# Patient Record
Sex: MALE | Race: WHITE | Employment: FULL TIME | URBAN - METROPOLITAN AREA
[De-identification: names, ages, dates, MRNs, and addresses within clinical notes are randomized per-mention and may not be internally consistent; named-entity substitution may affect disease eponyms.]

---

## 2017-05-28 ENCOUNTER — APPOINTMENT (OUTPATIENT)
Dept: GENERAL RADIOLOGY | Facility: CLINIC | Age: 47
End: 2017-05-28
Attending: EMERGENCY MEDICINE
Payer: COMMERCIAL

## 2017-05-28 ENCOUNTER — HOSPITAL ENCOUNTER (EMERGENCY)
Facility: CLINIC | Age: 47
Discharge: HOME | End: 2017-05-28
Attending: EMERGENCY MEDICINE | Admitting: EMERGENCY MEDICINE
Payer: COMMERCIAL

## 2017-05-28 VITALS
SYSTOLIC BLOOD PRESSURE: 147 MMHG | BODY MASS INDEX: 25.2 KG/M2 | WEIGHT: 180 LBS | OXYGEN SATURATION: 100 % | RESPIRATION RATE: 18 BRPM | DIASTOLIC BLOOD PRESSURE: 87 MMHG | HEART RATE: 59 BPM | TEMPERATURE: 97.7 F | HEIGHT: 71 IN

## 2017-05-28 DIAGNOSIS — S62.623A CLOSED DISPLACED FRACTURE OF MIDDLE PHALANX OF LEFT MIDDLE FINGER, INITIAL ENCOUNTER: ICD-10-CM

## 2017-05-28 DIAGNOSIS — S09.90XA CLOSED HEAD INJURY, INITIAL ENCOUNTER: ICD-10-CM

## 2017-05-28 DIAGNOSIS — T07.XXXA MULTIPLE ABRASIONS: ICD-10-CM
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
## 2017-05-28 PROCEDURE — 25000132 ZZH RX MED GY IP 250 OP 250 PS 637: Performed by: EMERGENCY MEDICINE

## 2017-05-28 PROCEDURE — 99283 EMERGENCY DEPT VISIT LOW MDM: CPT

## 2017-05-28 PROCEDURE — 73140 X-RAY EXAM OF FINGER(S): CPT | Mod: LT

## 2017-05-28 RX ORDER — ACETAMINOPHEN 325 MG/1
650 TABLET ORAL ONCE
Status: COMPLETED | OUTPATIENT
Start: 2017-05-28 | End: 2017-05-28

## 2017-05-28 RX ADMIN — ACETAMINOPHEN 650 MG: 325 TABLET, FILM COATED ORAL at 14:54

## 2017-05-28 NOTE — ED AVS SNAPSHOT
Emergency Department    [address] [facility] [address]    Phone:  [phone]    Fax:  [phone]                                       Colten Lucas   MRN: [id number]    Department:   Emergency Department   Date of Visit:  5/28/2017           Patient Information     Date Of Birth          1970        Your diagnoses for this visit were:     Fall, initial encounter     Closed head injury, initial encounter     Closed displaced fracture of middle phalanx of left middle finger, initial encounter     Multiple abrasions        You were seen by Ward Carroll MD.      Follow-up Information     Schedule an appointment as soon as possible for a visit with Frieda Holland MD.    Specialty:  Orthopedics    Contact information:    [facility]  [facility] [address] [address]  [phone]        Discharge References/Attachments     FRACTURE, FINGER, CLOSED (ENGLISH)    HEAD INJURY, NO WAKE-UP (ADULT) (ENGLISH)      24 Hour Appointment Hotline       To make an appointment at any [facility], call [phone] ([phone]). If you don't have a family doctor or clinic, we will help you find one. [facility] clinics are conveniently located to serve the needs of you and your family.             Review of your medicines      Notice     You have not been prescribed any medications.            Procedures and tests performed during your visit     Fingers XR, 2-3 views, left      Orders Needing Specimen Collection     None      Pending Results     No orders found from 5/26/2017 to 5/29/2017.            Pending Culture Results     No orders found from 5/26/2017 to 5/29/2017.            Pending Results Instructions     If you had any lab results that were not finalized at the time of your Discharge, you can call the ED Lab Result RN at [phone]. You will be contacted by this team for any positive Lab results or changes in treatment. The nurses are  available 7 days a week from 10A to 6:30P.  You can leave a message 24 hours per day and they will return your call.        Test Results From Your Hospital Stay        5/28/2017  2:58 PM      Narrative     XR FINGER LT G/E 2 VW 5/28/2017 2:54 PM    COMPARISON: None.    HISTORY: Fall        Impression     IMPRESSION: Mildly displaced avulsion fracture at the lateral volar  base of the left third middle phalanx. No other fractures are  suspected in the left hand. Joints are preserved.    BENNY FOSTER                Clinical Quality Measure: Blood Pressure Screening     Your blood pressure was checked while you were in the emergency department today. The last reading we obtained was  BP: 147/87 . Please read the guidelines below about what these numbers mean and what you should do about them.  If your systolic blood pressure (the top number) is less than 120 and your diastolic blood pressure (the bottom number) is less than 80, then your blood pressure is normal. There is nothing more that you need to do about it.  If your systolic blood pressure (the top number) is 120-139 or your diastolic blood pressure (the bottom number) is 80-89, your blood pressure may be higher than it should be. You should have your blood pressure rechecked within a year by a primary care provider.  If your systolic blood pressure (the top number) is 140 or greater or your diastolic blood pressure (the bottom number) is 90 or greater, you may have high blood pressure. High blood pressure is treatable, but if left untreated over time it can put you at risk for heart attack, stroke, or kidney failure. You should have your blood pressure rechecked by a primary care provider within the next 4 weeks.  If your provider in the emergency department today gave you specific instructions to follow-up with your doctor or provider even sooner than that, you should follow that instruction and not wait for up to 4 weeks for your follow-up visit.        Thank  "you for choosing [facility]       Thank you for choosing [facility] for your care. Our goal is always to provide you with excellent care. Hearing back from our patients is one way we can continue to improve our services. Please take a few minutes to complete the written survey that you may receive in the mail after you visit with us. Thank you!        [employer] Information     [employer] lets you send messages to your doctor, view your test results, renew your prescriptions, schedule appointments and more. To sign up, go to [email or web address] . Click on \"Log in\" on the left side of the screen, which will take you to the Welcome page. Then click on \"Sign up Now\" on the right side of the page.     You will be asked to enter the access code listed below, as well as some personal information. Please follow the directions to create your username and password.     Your access code is: [email or web address]  Expires: 2017  3:22 PM     Your access code will  in 90 days. If you need help or a new code, please call your [facility] clinic or [phone].        Care EveryWhere ID     This is your Care EveryWhere ID. This could be used by other organizations to access your [facility] medical records  [id number]        After Visit Summary       This is your record. Keep this with you and show to your community pharmacist(s) and doctor(s) at your next visit.                  "

## 2017-05-28 NOTE — ED NOTES
Agree with triage note. Fall happened at 0745 this AM, since then, pt has developed swelling to left third and fourth fingers. Modified NIH assessment unremarkable.

## 2017-05-29 NOTE — ED PROVIDER NOTES
CHIEF COMPLAINT:  Fall.      HISTORY OF PRESENT ILLNESS:  Colten Lucas is a 47-year-old, out running with his dog, when his dog on the leash cut in front of him, causing him to fall forward.  He remembers going down and using his hands and knees to catch him, but he did go sideways and did strike the right side of his face.  The patient states he briefly saw stars but did not pass out.  He was able to get up and complete his run and get home.  When he talked to his daughter at home, though, he stated he could not recall how he got home or even using the code to open the garage door.  He has a mild headache.  He initially had some nausea and sweats but has not vomited, and he does not feel like vomiting.  He has no neck pain.  He has no visual problems and no focal numbness or weakness.  He does have some concerns about the middle and ring fingers on his left hand which are sore and bruised.  He did clean all of his abrasions.  His tetanus is up to date.      PAST MEDICAL HISTORY:  Unremarkable for significant surgeries or hospitalizations other than tonsillectomy.      MEDICATIONS:  None.      ALLERGIES:  None.      FAMILY AND SOCIAL HISTORY:  He does orthopedic coding.  .  Does not smoke or drink significantly.      REVIEW OF SYSTEMS:  As noted, all other systems negative.      PHYSICAL EXAMINATION:   GENERAL:  White male sitting in a chair.   VITAL SIGNS:  Temperature 97.7, blood pressure 147/87, pulse 59, respirations 18, pulse ox 100% on room air.   HEENT:  The pupils are equal and reactive at 3 mm.  The extraocular movements are intact.  TMs are not seen due to cerumen.  Oropharynx is clear.  The face reveals no swelling.  When he does open and close his jaw, he has some minimal left TMJ tenderness, but good alignment and no deformity.   NECK:  There is no posterior tenderness.  Full range of motion.   CHEST:  Nontender.   LUNGS:  Clear.   HEART:  Tones regular, without murmurs, rubs or gallops.    ABDOMEN:  Soft without tenderness or masses.   MUSCULOSKELETAL:  Abrasions on both knees, but no bony tenderness.  Full range of motion.  No effusion.  Left hand reveals some ecchymoses of the ring and long fingers, primarily at the PIP joints.  The patient has full extension and flexion, both profundus and superficialis, against resistance.  No joint effusion is palpated.   NEUROLOGIC:  He is awake, alert and appropriate.  Fluent speech.  No facial asymmetry.  Normal sensation, motor and coordination throughout.  His gait is stable.  He is GCS 15.      COURSE:  The patient had cleaned his wounds himself.  He received two Tylenol.      He had an x-ray of the left hand.  The left hand shows a small avulsion fracture at the base of the middle phalanx of the long finger.      The patient had a ring on his left ring finger, and we assisted in removing this, and then the ring and long finger were daphnie taped.      It has now been 8 hours since injury.  He has no sign of a significant intracranial head injury, and does not require a CT scan of the head.  Also, I do not think there is any facial fracture.  There may have been a contusion at the TMJ on the left side.      The patient should use cold and Tylenol.  I have asked him to make a follow-up with Dr. Marleen Holland for his finger injury, and asked him to take it easy this week without significant physical exertion until his head has completely cleared and he is symptom-free. Follow up with his PMD next week. If he should have recurrent or worsening problems such as persistent or worsening headache, persistent vomiting, or confusion, he is to return to the ED.      IMPRESSION:   1.  Fall.   2.  Closed head trauma.   3.  TMJ contusion.   4.  Multiple abrasions.   5.  Avulsion fracture of the base of the middle phalanx of left long finger.      PLAN:  As noted above.         QUINTON SHERIDAN MD             D: 05/28/2017 16:34   T: 05/28/2017 23:21   MT: [id number]       Name:     HAILEE JUÁREZ   MRN:      [id number]        Account:      [id number]   :      1970           Visit Date:   2017      Document: [id number]       cc: Frieda Holland MD

## 2017-05-31 ENCOUNTER — OFFICE VISIT (OUTPATIENT)
Dept: FAMILY MEDICINE | Facility: CLINIC | Age: 47
End: 2017-05-31
Payer: COMMERCIAL

## 2017-05-31 VITALS
HEART RATE: 81 BPM | DIASTOLIC BLOOD PRESSURE: 84 MMHG | OXYGEN SATURATION: 100 % | BODY MASS INDEX: 26.32 KG/M2 | HEIGHT: 71 IN | TEMPERATURE: 98.4 F | SYSTOLIC BLOOD PRESSURE: 132 MMHG | WEIGHT: 188 LBS
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
## 2017-05-31 DIAGNOSIS — S06.0X0D CONCUSSION, WITHOUT LOSS OF CONSCIOUSNESS, SUBSEQUENT ENCOUNTER: ICD-10-CM

## 2017-05-31 DIAGNOSIS — S09.90XD CLOSED HEAD INJURY, SUBSEQUENT ENCOUNTER: ICD-10-CM

## 2017-05-31 DIAGNOSIS — S62.623D CLOSED DISPLACED FRACTURE OF MIDDLE PHALANX OF LEFT MIDDLE FINGER WITH ROUTINE HEALING, SUBSEQUENT ENCOUNTER: Primary | ICD-10-CM

## 2017-05-31 PROCEDURE — 99203 OFFICE O/P NEW LOW 30 MIN: CPT | Performed by: PHYSICIAN ASSISTANT

## 2017-05-31 NOTE — LETTER
[facility]  [address]  [facility] [address]  Phone: [phone]  Fax: [phone]    May 31, 2017        Colten Lucas  [address]  [facility] [address]          To whom it may concern:    RE: [place] MCCRAY Lance    Patient was seen and treated today at our clinic. Please allow to work with restrictions including short interval concentration or allow to leave work early if becomes symptomatic for the next 1-2 weeks.     Please contact me for questions or concerns.      Sincerely,        Dalia Murcia PA-C

## 2017-05-31 NOTE — MR AVS SNAPSHOT
After Visit Summary   5/31/2017    Colten Lucas    MRN: [id number]           Patient Information     Date Of Birth          1970        Visit Information        Provider Department      5/31/2017 1:00 PM Dalia Murcia PA-C [facility]        Today's Diagnoses     Closed displaced fracture of middle phalanx of left middle finger with routine healing, subsequent encounter    -  1    Fall, subsequent encounter        Closed head injury, subsequent encounter        Concussion, without loss of consciousness, subsequent encounter          Care Instructions    Given displacement of fracture still did advise consult with hand specialist to ensure no further treatment required.  May also have sustained low-grade concussion given mild HAs and general fatigue.  Reassuring neuro exam.  Advised continued cognitive rest and low-impact activity.  Re-check in 2 weeks, sooner with concerns.    Electronically Signed By: Dalia Murcia PA-C            Follow-ups after your visit        Who to contact     If you have questions or need follow up information about today's clinic visit or your schedule please contact [facility] directly at [phone].  Normal or non-critical lab and imaging results will be communicated to you by MyChart, letter or phone within 4 business days after the clinic has received the results. If you do not hear from us within 7 days, please contact the clinic through MyChart or phone. If you have a critical or abnormal lab result, we will notify you by phone as soon as possible.  Submit refill requests through [employer] or call your pharmacy and they will forward the refill request to us. Please allow 3 business days for your refill to be completed.          Additional Information About Your Visit        MyChart Information     [employer] lets you send messages to your doctor, view your test results, renew your prescriptions, schedule  "appointments and more. To sign up, go to [email or web address] . Click on \"Log in\" on the left side of the screen, which will take you to the Welcome page. Then click on \"Sign up Now\" on the right side of the page.     You will be asked to enter the access code listed below, as well as some personal information. Please follow the directions to create your username and password.     Your access code is: [email or web address]  Expires: 2017  3:22 PM     Your access code will  in 90 days. If you need help or a new code, please call your [facility] clinic or [phone].        Care EveryWhere ID     This is your Care EveryWhere ID. This could be used by other organizations to access your [facility] medical records  [id number]        Your Vitals Were     Pulse Temperature Height Pulse Oximetry BMI (Body Mass Index)       81 98.4  F (36.9  C) (Oral) 5' 11\" (1.803 m) 100% 26.22 kg/m2        Blood Pressure from Last 3 Encounters:   17 132/84   17 147/87    Weight from Last 3 Encounters:   17 188 lb (85.3 kg)   17 180 lb (81.6 kg)              Today, you had the following     No orders found for display       Primary Care Provider    Physician No Ref-Primary       No address on file        Thank you!     Thank you for choosing [facility]  for your care. Our goal is always to provide you with excellent care. Hearing back from our patients is one way we can continue to improve our services. Please take a few minutes to complete the written survey that you may receive in the mail after your visit with us. Thank you!             Your Updated Medication List - Protect others around you: Learn how to safely use, store and throw away your medicines at www.disposemymeds.org.      Notice  As of 2017  1:20 PM    You have not been prescribed any medications.      "

## 2017-05-31 NOTE — PROGRESS NOTES
SUBJECTIVE:                                                    Colten Lucas is a 47 year old male who presents to clinic today for the following health issues:      ED/UC Followup:    Facility:  [facility]  Date of visit: 5/28/2017  Reason for visit: Fall and hit his head and injured his finger.  ED reviewed had been running with his dog when dog pulled him with leash resulting fall.   Sustained knee abrasions, but no other knee issues.  Was able to run home still.  Did wind up striking face, cheek, shoulder and elbow but reports no problems with any of this.  No LOC, vision changes, vomiting.   After fall felt a little sweaty and nauseated, but that was it.  Felt better yesterday afternoon.  Today had a little HA and just general overall feeling of fatigue.   Still will feel a little nauseated, but overall this is better from before. Estimates at least 50% improvement.  Continues to have no vomiting.   No prior hx of concussion or significant head injury.     Did sustain volar finger fracture. Was advised to see hand visit. Would like new referral for [facility] as closer for him to get to with work.     R-handed.     Sochx: codes for orthopedic surgery. Tried to work yesterday for about 2 hours, but sitting and looking at all screens/codes couldn't focus well and gave him a headache. Works for [employer].    Current Status: feeling pretty good he said     Problem list and histories reviewed & adjusted, as indicated.  Additional history: as documented    There is no problem list on file for this patient.    Past Surgical History:   Procedure Laterality Date     C NONSPECIFIC PROCEDURE  ~1973    tonsillectomy       Social History   Substance Use Topics     Smoking status: Never Smoker     Smokeless tobacco: Not on file     Alcohol use Yes     History reviewed. No pertinent family history.      No current outpatient prescriptions on file.     Allergies   Allergen Reactions     Penicillins Rash  "      Reviewed and updated as needed this visit by clinical staff       Reviewed and updated as needed this visit by Provider           ROS:  Constitutional, HEENT, cardiovascular, pulmonary, GI, musculoskeletal, neuro systems are negative, except as otherwise noted.    OBJECTIVE:                                                    /84  Pulse 81  Temp 98.4  F (36.9  C) (Oral)  Ht 5' 11\" (1.803 m)  Wt 188 lb (85.3 kg)  SpO2 100%  BMI 26.22 kg/m2  Body mass index is 26.22 kg/(m^2).  GENERAL: healthy, alert and no distress  EYES: Eyes grossly normal to inspection, PERRL and conjunctivae and sclerae normal. EOMS intact.  HENT: ear canals and TM's normal excluding mostly obstructed by cerumen impaction bilaterally. Visualized portions were normal, nose and mouth without ulcers or lesions  NECK: no adenopathy, no asymmetry, masses.  RESP: lungs clear to auscultation - no rales, rhonchi or wheezes  CV: regular rate and rhythm, normal S1 S2, no S3 or S4, no murmur, click or rub, no peripheral edema and peripheral pulses strong  MS: pt has L 3rd/4th fingers buddy-taped. This was removed for exam. Pt is TTP along volar lateral aspect c/w known avulsion fracture. Has limited ROM at PIPJ due to pain. Slight healed abrasion noted to lateral aspect of distal phalanx, but remainder of finger non-tender.  Neuro: A&Ox3. No facial asymmetry. Cranial nerves grossly intact. Rapid alternating hand movements, finger-to-nose, and heel-to-shin exercises intact. Negative Romberg.     Diagnostic Test Results:  none      ASSESSMENT/PLAN:                                                        ICD-10-CM    1. Closed displaced fracture of middle phalanx of left middle finger with routine healing, subsequent encounter S62.623D ORTHOPEDICS ADULT REFERRAL   2. Fall, subsequent encounter W19.XXXD    3. Closed head injury, subsequent encounter S09.90XD    4. Concussion, without loss of consciousness, subsequent encounter S06.0X0D    New " referral placed for [facility] hand specialist to ensure no further treatment required given volar avulsion fx.  Pt will continue with daphnie taping in the meantime.  Also reviewed that he may have sustained a mild concussion with mild HA's/general fatigue.  Given difficulties with concentrating at work and should have cognitive rest I did provide a note for work for the next 1-2 weeks as generally concussions resolve on own within this timeframe.  Otherwise, reasssuring hx and neuro exam.  See Patient Instructions  Patient Instructions   Given displacement of fracture still did advise consult with hand specialist to ensure no further treatment required.  May also have sustained low-grade concussion given mild HAs and general fatigue.  Reassuring neuro exam.  Advised continued cognitive rest and low-impact activity.  Re-check in 2 weeks, sooner with concerns.    Electronically Signed By: Dalia Murcia PA-C

## 2017-05-31 NOTE — NURSING NOTE
"Chief Complaint   Patient presents with     ER F/U       Initial /84  Pulse 81  Temp 98.4  F (36.9  C) (Oral)  Ht 5' 11\" (1.803 m)  Wt 188 lb (85.3 kg)  SpO2 100%  BMI 26.22 kg/m2 Estimated body mass index is 26.22 kg/(m^2) as calculated from the following:    Height as of this encounter: 5' 11\" (1.803 m).    Weight as of this encounter: 188 lb (85.3 kg).  Medication Reconciliation: complete    "

## 2017-05-31 NOTE — PATIENT INSTRUCTIONS
Given displacement of fracture still did advise consult with hand specialist to ensure no further treatment required.  May also have sustained low-grade concussion given mild HAs and general fatigue.  Reassuring neuro exam.  Advised continued cognitive rest and low-impact activity.  Re-check in 2 weeks, sooner with concerns.    Electronically Signed By: Dalia Murcia PA-C

## 2017-06-02 ENCOUNTER — TELEPHONE (OUTPATIENT)
Dept: FAMILY MEDICINE | Facility: CLINIC | Age: 47
End: 2017-06-02

## 2017-06-02 NOTE — TELEPHONE ENCOUNTER
Per ED notes patient was to follow up with week with PMD for appointment. Seen by Dalia Murcia PA-C on 5/31/17. Patient is having more noticeable ringing in ears today. He has had the ringing since hitting his head on 5/28, but says its more noticeable today than it has been previously.  Has a little bit of a headache 3-4 rating. I have advised him to continue to rest when possible and decrease screen time. He says he may leave work early to rest. I told him I will review with Dalia and give him a call back.     Will route to Dalia Murcia PA-C for further review and advisement. Karen Gross R.N.

## 2017-06-02 NOTE — TELEPHONE ENCOUNTER
I left message for Moises with all of the below, informed of tomorrow clinic at [facility] and left him with that number also. Encouraged to call us back if he had any further questions or concerns. Karen Gross R.N.

## 2017-06-02 NOTE — TELEPHONE ENCOUNTER
Reason for call:  Patient reporting a symptom    Symptom or request: ringing in ears after concussion    Duration (how long have symptoms been present): since Sunday 5/28/2017    Have you been treated for this before? Yes    Additional comments: Pt was seen for follow up after ER visit.  Still having this issue with ringing in his ears.  Asking if there is anything he can do to help this.    Phone Number patient can be reached at:  Cell number on file:    Telephone Information:   Mobile [phone]       Best Time:      Can we leave a detailed message on this number:  YES    Call taken on 6/2/2017 at 9:33 AM by Monse Brady

## 2017-06-02 NOTE — TELEPHONE ENCOUNTER
Pt calling as he was seen for an ER follow up for a concussion and finger fracture.  His employer is asking he get some [place] paperwork for missed days from work on Tuesday, Wednesday and Thursday of this week and is back to work today.  [employer] will be faxing us paperwork to complete.  Pt can be reached at [phone] with any questions or concerns.  States the form will be more for the next 1 to 2 weeks for work restrictions.  He states he has a lot of ringing in his ears and a mild headache but otherwise feels as well as can be expected.  He was transferred to triage to discuss the ringing in his ears.  Will keep an eye out for the form.  Monse Brady

## 2017-06-02 NOTE — TELEPHONE ENCOUNTER
Please call pt and let him know that tinnitus can be a symptom of concussion as well. If marked continual worsening over the weekend can always be seen in the ED with consideration to head imaging, but given his mechanism of injury and normal neuro exam at our last appt together suspect he just needs more time for body to heal on own. Encourage continued cognitive rest as previously reviewed. Happy to re-check sooner in clinic if he has on-going concerns as well. Can also inform him that PL has clinic hours tomorrow should he wish.    Electronically Signed By: Dalia Murcia PA-C

## 2017-06-08 NOTE — TELEPHONE ENCOUNTER
Copy of form made for scanning, faxed and mailed original to patient.  Called and let him know he needs to be seen.  He will call within the next couple of days to make na appt.  Monse Brady

## 2017-06-13 ENCOUNTER — TELEPHONE (OUTPATIENT)
Dept: FAMILY MEDICINE | Facility: CLINIC | Age: 47
End: 2017-06-13

## 2017-06-13 NOTE — TELEPHONE ENCOUNTER
Reason for Call:  Form, our goal is to have forms completed with 72 hours, however, some forms may require a visit or additional information.    Type of letter, form or note:  FMLA    Who is the form from?: Patient    Where did the form come from: form was faxed in    What clinic location was the form placed at?: [facility]    Where the form was placed: Given to [facility].    What number is listed as a contact on the form?: fax [phone]       Additional comments: Pt has appt on Friday 6.16 to complete forms.  Will fax once done.    Call taken on 6/13/2017 at 10:31 AM by Monse Brady

## 2017-06-16 ENCOUNTER — OFFICE VISIT (OUTPATIENT)
Dept: FAMILY MEDICINE | Facility: CLINIC | Age: 47
End: 2017-06-16
Payer: COMMERCIAL

## 2017-06-16 VITALS
WEIGHT: 186 LBS | HEIGHT: 71 IN | BODY MASS INDEX: 26.04 KG/M2 | SYSTOLIC BLOOD PRESSURE: 138 MMHG | DIASTOLIC BLOOD PRESSURE: 78 MMHG | HEART RATE: 91 BPM | TEMPERATURE: 97.7 F | OXYGEN SATURATION: 99 %
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
## 2017-06-16 DIAGNOSIS — H93.13 TINNITUS, BILATERAL: ICD-10-CM

## 2017-06-16 DIAGNOSIS — S06.0X0A CONCUSSION WITHOUT LOSS OF CONSCIOUSNESS, INITIAL ENCOUNTER: Primary | ICD-10-CM

## 2017-06-16 PROCEDURE — 99214 OFFICE O/P EST MOD 30 MIN: CPT | Performed by: PHYSICIAN ASSISTANT

## 2017-06-16 NOTE — NURSING NOTE
"Chief Complaint   Patient presents with     Forms       Initial /78  Pulse 91  Temp 97.7  F (36.5  C) (Oral)  Ht 5' 11\" (1.803 m)  Wt 186 lb (84.4 kg)  SpO2 99%  BMI 25.94 kg/m2 Estimated body mass index is 25.94 kg/(m^2) as calculated from the following:    Height as of this encounter: 5' 11\" (1.803 m).    Weight as of this encounter: 186 lb (84.4 kg).  Medication Reconciliation: complete    "

## 2017-06-16 NOTE — PATIENT INSTRUCTIONS
Glad you're improving.  Continue low-impact activity only.  Need stepwise return and should you remain symptomatic need to return to previous phase.  Will refer for concussion rehab for on-going consult/treatment.  If any further fall would advise consideration to cardiac work-up.    Electronically Signed By: Dalia Murcia PA-C

## 2017-06-16 NOTE — MR AVS SNAPSHOT
After Visit Summary   6/16/2017    Colten Lucas    MRN: [id number]           Patient Information     Date Of Birth          1970        Visit Information        Provider Department      6/16/2017 1:00 PM Dalia Murcia PA-C [facility]        Today's Diagnoses     Concussion without loss of consciousness, initial encounter    -  1    Tinnitus, bilateral          Care Instructions    Glad you're improving.  Continue low-impact activity only.  Need stepwise return and should you remain symptomatic need to return to previous phase.  Will refer for concussion rehab for on-going consult/treatment.  If any further fall would advise consideration to cardiac work-up.    Electronically Signed By: Dalia Murcia PA-C            Follow-ups after your visit        Additional Services     CONCUSSION  REFERRAL       [facility] is referring you to the Concussion  service at [facility].      The  Representative will assist you in the coordination of your concussion care as prescribed by your physician.    The  Representative will contact you within one business day, or you may contact the  Representative at [phone].    Referral Options:  Non-Sports related concussion management and Rehab Services: Physical Therapy, Evaluate and Treat, Rehab Diagnoses: Decreased Balance and tinnitus       Coverage of these services are subject to the terms and limitations of your health insurance plan.  Please call member services at your health plan with any benefit or coverage questions.     If X-rays, CT or MRI's have been performed, please contact the facility where they were done, to arrange for  prior to your scheduled appointment.  Please bring this referral request to your appointment and present it to your specialist.                  Who to contact     If you have questions or need  "follow up information about today's clinic visit or your schedule please contact [facility] directly at [phone].  Normal or non-critical lab and imaging results will be communicated to you by MyChart, letter or phone within 4 business days after the clinic has received the results. If you do not hear from us within 7 days, please contact the clinic through [employer] or phone. If you have a critical or abnormal lab result, we will notify you by phone as soon as possible.  Submit refill requests through [employer] or call your pharmacy and they will forward the refill request to us. Please allow 3 business days for your refill to be completed.          Additional Information About Your Visit        [employer] Information     [employer] lets you send messages to your doctor, view your test results, renew your prescriptions, schedule appointments and more. To sign up, go to [email or web address] . Click on \"Log in\" on the left side of the screen, which will take you to the Welcome page. Then click on \"Sign up Now\" on the right side of the page.     You will be asked to enter the access code listed below, as well as some personal information. Please follow the directions to create your username and password.     Your access code is: [email or web address]  Expires: 2017  3:22 PM     Your access code will  in 90 days. If you need help or a new code, please call your [place] clinic or [phone].        Care EveryWhere ID     This is your Care EveryWhere ID. This could be used by other organizations to access your [place] medical records  [id number]        Your Vitals Were     Pulse Temperature Height Pulse Oximetry BMI (Body Mass Index)       91 97.7  F (36.5  C) (Oral) 5' 11\" (1.803 m) 99% 25.94 kg/m2        Blood Pressure from Last 3 Encounters:   17 138/78   17 132/84   17 147/87    Weight from Last 3 Encounters:   17 186 lb (84.4 kg)   17 188 lb (85.3 kg)   17 180 lb " (81.6 kg)              We Performed the Following     CONCUSSION  REFERRAL        Primary Care Provider    Physician No Ref-Primary       No address on file        Thank you!     Thank you for choosing [facility]  for your care. Our goal is always to provide you with excellent care. Hearing back from our patients is one way we can continue to improve our services. Please take a few minutes to complete the written survey that you may receive in the mail after your visit with us. Thank you!             Your Updated Medication List - Protect others around you: Learn how to safely use, store and throw away your medicines at www.disposemymeds.org.      Notice  As of 6/16/2017  1:38 PM    You have not been prescribed any medications.

## 2017-06-16 NOTE — TELEPHONE ENCOUNTER
Moises is calling back today and was see by Dalia Murcia PA-C today. He asked if [place] he was [place] today will cover everything for his employer or if he needs an additional note. I informed him [place] should cover but he can ask his employer if that will cover everything. I told him if they say they do need an additional note to please ask them for the specifics that they want the note to cover. Moises says he is feeling better and he will call us back if a letter would be needed in addition to the paperwork he received today. No further questions or concerns. Karen Gross R.N.      [employer]ML[employer] paperwork given to patient at today's office visit. Karen Gross R.N.

## 2017-06-16 NOTE — PROGRESS NOTES
SUBJECTIVE:                                                    Colten Lucas is a 47 year old male who presents to clinic today for the following health issues:    Re-check concussion. Reports that he is overall feeling a lot better. Significant improvement since onset.  A few days after we saw him did notify the nurse that he had tinnitus.  Has continued to notice this with waxing/waning severity.  Very light HAs, but nothing like before. Maybe rates as 2/10. Overwhelming sense of fatigue is much much better.  Started working full days on Monday. Feels not quite at 100% as feels it takes him longer to read. At end of 5-6 hours harder to see things at bottom of the screen.     Tuesday had a little set back when tried to go back to exercising and went on the treadmill.   He walked for 30 minutes and then ringing in his ears seemed more persistent, started to see spots in his vision. Tried to stop the treadmill, but then reports he fell. Does not feel that he passed out or lost consciousness. No head injury. All happened so fast. Again reports he's confident that he did not pass out. Not sure if he just got off balance.  Feels he might have just tried to increase things too quickly.  States he just couldn't help himself and just wanted to get back to his usual activity.  Then yesterday he went for walk with intermittent jog for 5 miles. States the next day he woke-up with ringing in the ears was worse than usual.  Took Wednesday off from exercise and reports that for the past 2 days has really felt well again overall.    Needs FMLA paperwork completed.       Problem list and histories reviewed & adjusted, as indicated.  Additional history: as documented    There is no problem list on file for this patient.    Past Surgical History:   Procedure Laterality Date     C NONSPECIFIC PROCEDURE  ~1973    tonsillectomy       Social History   Substance Use Topics     Smoking status: Never Smoker     Smokeless tobacco:  "Not on file     Alcohol use Yes     History reviewed. No pertinent family history.      No current outpatient prescriptions on file.     Allergies   Allergen Reactions     Penicillins Rash       Reviewed and updated as needed this visit by clinical staff  Tobacco  Allergies  Meds  Med Hx  Surg Hx  Fam Hx  Soc Hx      Reviewed and updated as needed this visit by Provider  Tobacco  Allergies  Meds  Med Hx  Surg Hx  Fam Hx  Soc Hx          ROS:  Constitutional, neuro, ENT, pulmonary, cardiac, gastrointestinal, genitourinary, musculoskeletal, integument and psychiatric systems are negative, except as otherwise noted.    OBJECTIVE:                                                    /78  Pulse 91  Temp 97.7  F (36.5  C) (Oral)  Ht 5' 11\" (1.803 m)  Wt 186 lb (84.4 kg)  SpO2 99%  BMI 25.94 kg/m2  Body mass index is 25.94 kg/(m^2).  GENERAL: healthy, alert and no distress  EYES: Eyes grossly normal to inspection, PERRL and conjunctivae and sclerae normal  HENT: ear canals and TM's normal, nose and mouth without ulcers or lesions  NECK: no adenopathy, no asymmetry, masses, or scars and thyroid normal to palpation. No carotid bruits.  RESP: lungs clear to auscultation - no rales, rhonchi or wheezes  CV: regular rate and rhythm, normal S1 S2, no S3 or S4, no murmur, click or rub, no peripheral edema and peripheral pulses strong  Neuro: A&Ox3. No facial asymmetry. Cranial nerves grossly intact. Rapid alternating hand movements, finger-to-nose, and heel-to-shin exercises intact. Negative Romberg.     Diagnostic Test Results:  none      ASSESSMENT/PLAN:                                                        ICD-10-CM    1. Concussion without loss of consciousness, initial encounter S06.0X0A CONCUSSION  REFERRAL   2. Tinnitus, bilateral H93.13 CONCUSSION  REFERRAL   3. Fall, initial encounter W19.XXXA    Hx suggests return to usual activity level too soon with regression of concussive sx " following trying to run on the treadmill and may also have secondary balance issues with reporting a fall as well.  Pt asked multiple times and was confident that he did not pass out.  Certainly balance issues can be sequelae of post-concussive syndrome in addition to tinnitus, but advised follow-up with PT for additional post-concussive rehab.  Again reviewed importance of allowing brain enough time to heal and not to progress to further unless asymptomatic. Pt states understanding.  I also advised EKG to ensure no cardiac origin of this, but pt declines as again feels confident he did not pass out. Advised to complete should he suffer any further falling episodes and pt in agreement.  Excluding that noted above, pt otherwise feels he is much improved since onset.  FMLA paperwork completed as well. Copy sent to scanning.  See Patient Instructions  Patient Instructions   Glad you're improving.  Continue low-impact activity only.  Need stepwise return and should you remain symptomatic need to return to previous phase.  Will refer for concussion rehab for on-going consult/treatment.  If any further fall would advise consideration to cardiac work-up.    Electronically Signed By: Dalia Murcia PA-C

## 2017-06-19 ENCOUNTER — TELEPHONE (OUTPATIENT)
Dept: FAMILY MEDICINE | Facility: CLINIC | Age: 47
End: 2017-06-19

## 2017-06-19 NOTE — TELEPHONE ENCOUNTER
Patient states [employer] called regarding [place].  Needs dates from 6/2 - 6/16 verified.  They are going to resend this paperwork.  Please complete and send back.  Also was referred for concussion consultation - scheduled for 6/28.      Please call with questions [phone] ok to leave detailed message    Trisha Ding

## 2017-06-19 NOTE — TELEPHONE ENCOUNTER
Patient calling to request a note from PCP regarding his current work restrictions.  States had one previously however feels the [facility] paperwork may have been filled out to specific requirements and would like a letter for work outlining the work restrictions reflected in paperwork.    Please advise, patient will  when ready.  Mala Vaughn RN  [employer]

## 2017-06-19 NOTE — LETTER
[facility]  [address]  [place] [address]  Phone: [phone]  Fax: [phone]    June 21, 2017        Colten Lucas  [address]  [facility] [address]          To whom it may concern:    RE: Colten Lucas      Please allow to work with restrictions including short interval concentration or allow to leave work early if becomes symptomatic from concussion symptoms until further evaluation with concussion rehab on 6/28/2017.      Please contact me for questions or concerns.      Sincerely,        Dalia Murcia PA-C

## 2017-06-21 NOTE — TELEPHONE ENCOUNTER
Letter and original of [employer] paperwork at .  Faxed [employer] paperwork, copy made and sent to scanning.  Monse Brady

## 2017-06-21 NOTE — TELEPHONE ENCOUNTER
New letter completed at pt request and placed in TC out-box. Pt will RTC to pick this up. Updated [facility] paperwork completed as well and placed in TC box to be faxed.  Electronically Signed By: Dalia Murcia PA-C

## 2017-06-28 ENCOUNTER — OFFICE VISIT (OUTPATIENT)
Dept: SURGERY | Facility: CLINIC | Age: 47
End: 2017-06-28

## 2017-06-28 VITALS
WEIGHT: 190.5 LBS | SYSTOLIC BLOOD PRESSURE: 160 MMHG | BODY MASS INDEX: 26.67 KG/M2 | TEMPERATURE: 98.2 F | DIASTOLIC BLOOD PRESSURE: 99 MMHG | HEIGHT: 71 IN | OXYGEN SATURATION: 99 % | HEART RATE: 64 BPM

## 2017-06-28 DIAGNOSIS — H93.13 TINNITUS, BILATERAL: ICD-10-CM

## 2017-06-28 DIAGNOSIS — S06.0X0A CONCUSSION, WITHOUT LOC, INITIAL ENCOUNTER: Primary | ICD-10-CM

## 2017-06-28 ASSESSMENT — PAIN SCALES - GENERAL: PAINLEVEL: NO PAIN (0)

## 2017-06-28 ASSESSMENT — ANXIETY QUESTIONNAIRES
GAD7 TOTAL SCORE: 6
1. FEELING NERVOUS, ANXIOUS, OR ON EDGE: SEVERAL DAYS
6. BECOMING EASILY ANNOYED OR IRRITABLE: MORE THAN HALF THE DAYS
3. WORRYING TOO MUCH ABOUT DIFFERENT THINGS: SEVERAL DAYS
7. FEELING AFRAID AS IF SOMETHING AWFUL MIGHT HAPPEN: NOT AT ALL
2. NOT BEING ABLE TO STOP OR CONTROL WORRYING: SEVERAL DAYS
IF YOU CHECKED OFF ANY PROBLEMS ON THIS QUESTIONNAIRE, HOW DIFFICULT HAVE THESE PROBLEMS MADE IT FOR YOU TO DO YOUR WORK, TAKE CARE OF THINGS AT HOME, OR GET ALONG WITH OTHER PEOPLE: SOMEWHAT DIFFICULT
5. BEING SO RESTLESS THAT IT IS HARD TO SIT STILL: NOT AT ALL

## 2017-06-28 ASSESSMENT — PATIENT HEALTH QUESTIONNAIRE - PHQ9: 5. POOR APPETITE OR OVEREATING: SEVERAL DAYS

## 2017-06-28 NOTE — PROGRESS NOTES
[facility] Concussion Clinic Admission  June 28, 2017        Assessment:  Colten Lucas is a 47 year old yo male who presents for concussion symptom management.    Plan:  1. Biggest concern of pt addressed:    2. Symptoms most affecting pt: no LOC    3. Restrictions:  a. Work- Up to 8 hrs 5 days a week.  Breaks or early end to work day if symptom arise, including vision changes/ tinnitus/ headache.  b. Driving-Ok to drive  c. Activities-Light, low impact aerobic activty up to 30 mins.  May increase slowly as tolerated    4. Referral to: audiology  5. Follow up here PRN  6. Letters written today for:  Work ([facility]);  [place] forms also faxed    AVS Instructions:    ~ You WILL get better~    GENERAL ADVICE  ~~ Avoid activities that trigger your symptoms.  Stop your activity  as soon as you feel the symptoms coming on, or earlier if you feel your symptoms coming on.  ~~ Do not try to push through symptoms or they may get worse or take longer to go away.  ~~ Allow yourself more time for activity   ~~ Drink plenty of water throughout the day (8-10 glasses per day)  ~~ Avoid alcohol and caffeine  ~~ Write things down.  At home, at work, whenever there is something that you should remember, even it is simple.    SLEEP  ~~ You need to sleep.   ~~ Attempt for 8 hours of sleep a night. If you are having issues sleeping at night, avoid napping during the day.    ~~ Over the counter medications that may help with sleep include     -Benadryl - take lower dose (25 mg) immediately before bed.  May cause morning drowsiness   -Melatonin - take 1-2 hours before bedtime.      SCREEN  ~ Look into justgetflux.com to adjust your screen resolutions~  ~ Increase font size  ~ limit screen activities including computer, TV, e-readers and phones.  ~ Avoid reading if it increases your symptoms.  Audio- books are a great option often available at your public library.    ACTIVITY  ~ Okay to gradually increase from low impact activities to higher  "impact, like running/ jogging.  Start with short bursts and slowly increase time/distance.           Follow up in concussion clinic as needed.         Follow up questions for telephone follow up:  Has OTC medication been helpful for sleep?    Has tinnitus improve?  Was appointment with audiology scheduled?      HPI  Time/date of injury:  05/28/2017  Mechanism:  Fall while running      The patient is a 47M who was out running with his dog on 05/28 when the dog darted from his right to left side.  The patient states that he was wearing a weighted vest and tripped, falling forward and striking the right side of his forehead.  He did not black out or lose consciousness.  However, he does not remember the jog home.      The patient was seen in the ER on 05/28 and found to have multiple contusions and abrasions, as well as an avulsion fracture to the base of the left long finger. He was alert and oriented, albeit somewhat confused, did not undergo head CT but was diagnosed with closed head injury.  He initially complained of headache, tinnitus, and fatigue.    The patient was subsequently seen 06/16 for an office visit and reported feeling \"much better.\"  He reported that his headache was much improved, although he continued to report tinnitus that was worse with impact activity and also at night, and was referred to concussion clinic for further evaluation.    The patient today states that he continues to improve and feels much better.  He has returned to work, initially with limited hours and now essentially full 8 hour days.  He states that he does notice increased fatigue, visual disturbances, and headache after several hours of looking at a computer screen, which is required for his work as a  at [employer].  He is trying to take breaks or stop working when his symptoms become severe.  His other major symptom is tinnitus, which is worse at night and often keeps him awake.  Has not tried any sleep aid, but is interested " in over the counter options.     He has been able to drive without symptoms.     He has also been walking and has resumed some limited amounts of running/ jogging without triggering symptoms.      Current details of HA:  Denies headache at this time    Injury specifics:  1. Direct or indirect injury:  Direct  2. Evidence of intracranial injury or skull fracture? No   3. Location of Impact: Right frontal  4. Any retrograde amnesia and how long: No  5. Any Anterograde amnesia?  Yes.  Doesn't remember run home.  6. LOC:  No     HA/ Concussion history  Prior HA history:No  Prior Migraine hx: NA  Prior treatment for HA or concussions: NA  Prior concussion history:  No   Concussion date:  NA     Longest symptom duration: NA    Mental health history (NONE of these unless in bold below)  Anxiety  Depression  Sleep disorder  Borderline   PTSD  Substance abuse  Bipolar  ADHD    No past medical history on file.    Patient Active Problem List   Diagnosis     Concussion without loss of consciousness, initial encounter     Tinnitus, bilateral         Pertinent social history:  Currently using alcohol:  Currently using nicotine:  Currently using caffeine  Currently Working:  Currently Living at and with:  Involved in what sports or activities:      Pertinent family history:  Was anyone in your family also injured:  Family history of migraines?    Current medications:  Reconciled in chart today by clinic staff and reviewed by me.    REVIEW OF SYSTEMS:  Refer to DocFlowsheets:  Concussion symptoms  CONSTITUTIONAL:  see Concussion symptoms  EYES: see Concussion symptoms  ENT: see Concussion symptoms    RESPIRATORY: no shortness of breath, no cough  CARDIOVASCULAR:, no chest pain or pressure or palpitations  GASTROINTESTINAL: no nausea or vomiting, or abdominal pain   GENITOURINARY: no dysuria, frequency or urgency or hematuria  MUSCULOSKELETAL: no weakness, no pain  SKIN: no rashes, ecchymosis, abrasions or lacerations  NEUROLOGIC:  no numbness or tingling of hands, no numbness or tingling of feet, no syncope, no tremors or weakness, no balance issues or gait disturbances  PSYCHIATRIC: see PHQ-9 and KATHERINE, Sleep: sleeping less than usual and trouble staying asleep.  Wakes up and is kept awake by tinnitus    OBJECTIVE:   There were no vitals taken for this visit.    Wt Readings from Last 4 Encounters:   06/16/17 186 lb   05/31/17 188 lb   05/28/17 180 lb       EXAM:  GENERAL: alert, oriented to person, place, time  HEAD: atraumatic, normocephalic, trachea midline  EYES: PERRL, EOMI, corneas and conjunctivae clear  EARS: pearly grey bilateral TMs and non-inflamed external ear canals, moderate cerumen  NECK:  No midline posterior tenderness, full AROM without pain or tenderness   CHEST/PULMONARY: normal respiratory rate and rhythm   CARDIOVASCULAR: extremities warm with good peripheral pulses  GI: soft, non-tender, no guarding, no rebound tenderness and no tenderness to palpation  BACK/SPINE: no deformity, no midline tenderness, no step-offs and no abrasions or contusions, no sacral tenderness.   MUSCULOSKELETAL / EXTREMITIES: normal extremities,  SKIN: no rashes, laceration, ecchymosis, skin warm and dry.   PSYCHIATRIC: affect/mood normal, cooperative, normal judgement/insight and memory intact.  Anxious appearing.  Relaxed appearing   Neuro:  Alert and oriented  Strength 5/5 extremities  Sensation 4/4 extremities    Shoulder shrug (C5):5/5  Bicep (C6):5/5  Tricep (C7):5/5  Neurologic/Visual:  JANINA: yes  EOMI: yes  Nystagmus: no  Painful eye movements: no  Convergence testing: Normal (</= 6 cm)    Coordination:       - Finger to Nose: normal       - Rapid Alternating Movements: normal    Balance Testing:       - Romberg: normal       - Backward Tandem: normal         Gait:  Walk in hallway at normal speed: Able  (write out first drop down)    Cognitive:  Immediate object recall: 4/4  Recall 4 objects at 5 minutes:4/4  Reverse months of the year:  Yes  No  Spell world backwards: Yes  No  Backwards number string:  Yes   Three stage command:  Yes  No      Time spent in one-on-one evaluation and discussion with patient regarding nature of problem, course, prior treatments, and therapeutic options, 75% of this 60 minute visit  was spent in counseling including this patients personal symptom triggers and education thereof.    Amanda Arias

## 2017-06-28 NOTE — PATIENT INSTRUCTIONS
~ You WILL get better~    GENERAL ADVICE  ~~ Avoid activities that trigger your symptoms.  Stop your activity  as soon as you feel the symptoms coming on, or earlier if you feel your symptoms coming on.  ~~ Do not try to push through symptoms or they may get worse or take longer to go away.  ~~ Allow yourself more time for activity   ~~ Drink plenty of water throughout the day (8-10 glasses per day)  ~~ Avoid alcohol and caffeine  ~~ Write things down.  At home, at work, whenever there is something that you should remember, even it is simple.    SLEEP  ~~ You need to sleep.   ~~ Attempt for 8 hours of sleep a night. If you are having issues sleeping at night, avoid napping during the day.    ~~ Over the counter medications that may help with sleep include     -Benadryl - take lower dose (25 mg) immediately before bed.  May cause morning drowsiness   -Melatonin - take 1-2 hours before bedtime.      SCREEN  ~ Look into justgetflux.com to adjust your screen resolutions~  ~ Increase font size  ~ limit screen activities including computer, TV, e-readers and phones.  ~ Avoid reading if it increases your symptoms.  Audio- books are a great option often available at your public library.    ACTIVITY  ~ Okay to gradually increase from low impact activities to higher impact, like running/ jogging.  Start with short bursts and slowly increase time/distance.           Follow up in concussion clinic as needed.

## 2017-06-28 NOTE — MR AVS SNAPSHOT
After Visit Summary   6/28/2017    Colten Lucas    MRN: [id number]           Patient Information     Date Of Birth          1970        Visit Information        Provider Department      6/28/2017 5:00 PM Amanda Arias MD [facility] Concussion        Care Instructions    ~ You WILL get better~    GENERAL ADVICE  ~~ Avoid activities that trigger your symptoms.  Stop your activity  as soon as you feel the symptoms coming on, or earlier if you feel your symptoms coming on.  ~~ Do not try to push through symptoms or they may get worse or take longer to go away.  ~~ Allow yourself more time for activity   ~~ Drink plenty of water throughout the day (8-10 glasses per day)  ~~ Avoid alcohol and caffeine  ~~ Write things down.  At home, at work, whenever there is something that you should remember, even it is simple.    SLEEP  ~~ You need to sleep.   ~~ Attempt for 8 hours of sleep a night. If you are having issues sleeping at night, avoid napping during the day.    ~~ Over the counter medications that may help with sleep include     -Benadryl - take lower dose (25 mg) immediately before bed.  May cause morning drowsiness   -Melatonin - take 1-2 hours before bedtime.      SCREEN  ~ Look into justgetflux.com to adjust your screen resolutions~  ~ Increase font size  ~ limit screen activities including computer, TV, e-readers and phones.  ~ Avoid reading if it increases your symptoms.  Audio- books are a great option often available at your public library.    ACTIVITY  ~ Okay to gradually increase from low impact activities to higher impact, like running/ jogging.  Start with short bursts and slowly increase time/distance.           Follow up in concussion clinic as needed.               Follow-ups after your visit        Follow-up notes from your care team     Return in about 4 weeks (around 7/26/2017), or if symptoms worsen or fail to improve.      Who to contact     Please call your  "clinic at [phone] to:    Ask questions about your health    Make or cancel appointments    Discuss your medicines    Learn about your test results    Speak to your doctor   If you have compliments or concerns about an experience at your clinic, or if you wish to file a complaint, please contact [facility] Patient Relations at [phone] or email us at [email or web address]         Additional Information About Your Visit        [employer] Information     "[employer] is an electronic gateway that provides easy, online access to your medical records. With "[employer] you can request a clinic appointment, read your test results, renew a prescription or communicate with your care team.     To sign up for "[employer] visit the website at [email or web address]   You will be asked to enter the access code listed below, as well as some personal information. Please follow the directions to create your username and password.     Your access code is: [email or web address]  Expires: 2017  3:22 PM     Your access code will  in 90 days. If you need help or a new code, please contact your [facility] or call [phone] for assistance.        Care EveryWhere ID     This is your Care EveryWhere ID. This could be used by other organizations to access your [place] medical records  [id number]        Your Vitals Were     Pulse Temperature Height Pulse Oximetry BMI (Body Mass Index)       64 98.2  F (36.8  C) (Oral) 5' 11\" 99% 26.57 kg/m2        Blood Pressure from Last 3 Encounters:   17 (!) 160/99   17 138/78   17 132/84    Weight from Last 3 Encounters:   17 190 lb 8 oz   17 186 lb   17 188 lb              Today, you had the following     No orders found for display       Primary Care Provider    Physician No Ref-Primary       No address on file        Equal Access to Services     BHANU DUDLEY: Ney Julian, " destiny luther, markoliliana taverasfermín dottiejose, aracelis keatonin hayaan jasielanca antoniamarily laLudyvira ah. So [facility] [phone].    ATENCIÓN: Si habla español, tiene a joya disposición servicios gratuitos de asistencia lingüística. Llame al [phone].    We comply with applicable federal civil rights laws and [place] laws. We do not discriminate on the basis of race, color, national origin, age, disability sex, sexual orientation or gender identity.            Thank you!     Thank you for choosing [facility]  for your care. Our goal is always to provide you with excellent care. Hearing back from our patients is one way we can continue to improve our services. Please take a few minutes to complete the written survey that you may receive in the mail after your visit with us. Thank you!             Your Updated Medication List - Protect others around you: Learn how to safely use, store and throw away your medicines at www.disposemymeds.org.      Notice  As of 6/28/2017  5:49 PM    You have not been prescribed any medications.

## 2017-06-28 NOTE — LETTER
June 28, 2017        To Whom It May Concern:    Colten Lucas, 1970, is under my care for a concussion that occurred on  05/28/2017.  He:       --- May return to work  with the following restrictions:  Work hour limited to 8 hours per day  Cease work/ limit work hours if experiencing worsening symptoms, including headache, vision changes, dizziness, nausea, tinnitus.        The following accommodations may help in reducing the cognitive load, thereby minimizing post-concussion symptoms.  As the employer, it is encouraged to discuss and establish accommodations based on individual work responsibilities.  If symptoms persist, more formal accommodations may be necessary.    1)  Allow more time for, or delay for projects.  2)  Allow more time for work completion.  3)  Allow for reduced work load.  4)  Allow for less multi-task work.  Single tasks until completion will improve productivity.  5)  Allow breaks as needed to control symptom levels.  For example, if symptoms worsen during a specific task, project or meeting, he/she may need to leave that area temporarily or rest in a quiet area until symptoms improve.  6)  Provide a quiet area for lunch.       Full or partial days missed due to post-concussion symptoms and follow up appointments should be medically excused.    Please feel free to contact me at the number below with any questions or concerns.    Sincerely,          Amanda colon @[facility]  Dept of Critical Care and Acute Care Surgery   [facility]  Administration office: [phone]  Clinic nurse line: [phone]  Fax: [phone]

## 2017-06-28 NOTE — LETTER
6/28/2017       RE: Colten Lucas  [address] [place] [address]     Dear Colleague,    Thank you for referring your patient, Colten Lucas, to the [facility] CONCUSSION at [facility]. Please see a copy of my visit note below.    [facility] Concussion Clinic Admission  June 28, 2017        Assessment:  Colten Lucas is a 47 year old yo male who presents for concussion symptom management.    Plan:  1. Biggest concern of pt addressed:    2. Symptoms most affecting pt: no LOC    3. Restrictions:  a. Work- Up to 8 hrs 5 days a week.  Breaks or early end to work day if symptom arise, including vision changes/ tinnitus/ headache.  b. Driving-Ok to drive  c. Activities-Light, low impact aerobic activty up to 30 mins.  May increase slowly as tolerated    4. Referral to: audiology  5. Follow up here PRN  6. Letters written today for:  Work ([facility]);  FMLA forms also faxed    AVS Instructions:    ~ You WILL get better~    GENERAL ADVICE  ~~ Avoid activities that trigger your symptoms.  Stop your activity  as soon as you feel the symptoms coming on, or earlier if you feel your symptoms coming on.  ~~ Do not try to push through symptoms or they may get worse or take longer to go away.  ~~ Allow yourself more time for activity   ~~ Drink plenty of water throughout the day (8-10 glasses per day)  ~~ Avoid alcohol and caffeine  ~~ Write things down.  At home, at work, whenever there is something that you should remember, even it is simple.    SLEEP  ~~ You need to sleep.   ~~ Attempt for 8 hours of sleep a night. If you are having issues sleeping at night, avoid napping during the day.    ~~ Over the counter medications that may help with sleep include     -Benadryl - take lower dose (25 mg) immediately before bed.  May cause morning drowsiness   -Melatonin - take 1-2 hours before bedtime.      SCREEN  ~ Look into justgetflux.com to adjust your screen resolutions~  ~  "Increase font size  ~ limit screen activities including computer, TV, e-readers and phones.  ~ Avoid reading if it increases your symptoms.  Audio- books are a great option often available at your public library.    ACTIVITY  ~ Okay to gradually increase from low impact activities to higher impact, like running/ jogging.  Start with short bursts and slowly increase time/distance.           Follow up in concussion clinic as needed.         Follow up questions for telephone follow up:  Has OTC medication been helpful for sleep?    Has tinnitus improve?  Was appointment with audiology scheduled?    HPI  Time/date of injury:  05/28/2017  Mechanism:  Fall while running      The patient is a 47M who was out running with his dog on 05/28 when the dog darted from his right to left side.  The patient states that he was wearing a weighted vest and tripped, falling forward and striking the right side of his forehead.  He did not black out or lose consciousness.  However, he does not remember the jog home.      The patient was seen in the ER on 05/28 and found to have multiple contusions and abrasions, as well as an avulsion fracture to the base of the left long finger. He was alert and oriented, albeit somewhat confused, did not undergo head CT but was diagnosed with closed head injury.  He initially complained of headache, tinnitus, and fatigue.    The patient was subsequently seen 06/16 for an office visit and reported feeling \"much better.\"  He reported that his headache was much improved, although he continued to report tinnitus that was worse with impact activity and also at night, and was referred to concussion clinic for further evaluation.    The patient today states that he continues to improve and feels much better.  He has returned to work, initially with limited hours and now essentially full 8 hour days.  He states that he does notice increased fatigue, visual disturbances, and headache after several hours of " looking at a computer screen, which is required for his work as a  at [employer].  He is trying to take breaks or stop working when his symptoms become severe.  His other major symptom is tinnitus, which is worse at night and often keeps him awake.  Has not tried any sleep aid, but is interested in over the counter options.     He has been able to drive without symptoms.     He has also been walking and has resumed some limited amounts of running/ jogging without triggering symptoms.      Current details of HA:  Denies headache at this time    Injury specifics:  1. Direct or indirect injury:  Direct  2. Evidence of intracranial injury or skull fracture? No   3. Location of Impact: Right frontal  4. Any retrograde amnesia and how long: No  5. Any Anterograde amnesia?  Yes.  Doesn't remember run home.  6. LOC:  No     HA/ Concussion history  Prior HA history:No  Prior Migraine hx: NA  Prior treatment for HA or concussions: NA  Prior concussion history:  No   Concussion date:  NA     Longest symptom duration: NA    Mental health history (NONE of these unless in bold below)  Anxiety  Depression  Sleep disorder  Borderline   PTSD  Substance abuse  Bipolar  ADHD    No past medical history on file.    Patient Active Problem List   Diagnosis     Concussion without loss of consciousness, initial encounter     Tinnitus, bilateral         Pertinent social history:  Currently using alcohol:  Currently using nicotine:  Currently using caffeine  Currently Working:  Currently Living at and with:  Involved in what sports or activities:      Pertinent family history:  Was anyone in your family also injured:  Family history of migraines?    Current medications:  Reconciled in chart today by clinic staff and reviewed by me.    REVIEW OF SYSTEMS:  Refer to DocFlowsheets:  Concussion symptoms  CONSTITUTIONAL:  see Concussion symptoms  EYES: see Concussion symptoms  ENT: see Concussion symptoms    RESPIRATORY: no shortness of breath, no  cough  CARDIOVASCULAR:, no chest pain or pressure or palpitations  GASTROINTESTINAL: no nausea or vomiting, or abdominal pain   GENITOURINARY: no dysuria, frequency or urgency or hematuria  MUSCULOSKELETAL: no weakness, no pain  SKIN: no rashes, ecchymosis, abrasions or lacerations  NEUROLOGIC: no numbness or tingling of hands, no numbness or tingling of feet, no syncope, no tremors or weakness, no balance issues or gait disturbances  PSYCHIATRIC: see PHQ-9 and KATHERINE, Sleep: sleeping less than usual and trouble staying asleep.  Wakes up and is kept awake by tinnitus    OBJECTIVE:   There were no vitals taken for this visit.    Wt Readings from Last 4 Encounters:   06/16/17 186 lb   05/31/17 188 lb   05/28/17 180 lb       EXAM:  GENERAL: alert, oriented to person, place, time  HEAD: atraumatic, normocephalic, trachea midline  EYES: PERRL, EOMI, corneas and conjunctivae clear  EARS: pearly grey bilateral TMs and non-inflamed external ear canals, moderate cerumen  NECK:  No midline posterior tenderness, full AROM without pain or tenderness   CHEST/PULMONARY: normal respiratory rate and rhythm   CARDIOVASCULAR: extremities warm with good peripheral pulses  GI: soft, non-tender, no guarding, no rebound tenderness and no tenderness to palpation  BACK/SPINE: no deformity, no midline tenderness, no step-offs and no abrasions or contusions, no sacral tenderness.   MUSCULOSKELETAL / EXTREMITIES: normal extremities,  SKIN: no rashes, laceration, ecchymosis, skin warm and dry.   PSYCHIATRIC: affect/mood normal, cooperative, normal judgement/insight and memory intact.  Anxious appearing.  Relaxed appearing   Neuro:  Alert and oriented  Strength 5/5 extremities  Sensation 4/4 extremities    Shoulder shrug (C5):5/5  Bicep (C6):5/5  Tricep (C7):5/5  Neurologic/Visual:  JANINA: yes  EOMI: yes  Nystagmus: no  Painful eye movements: no  Convergence testing: Normal (</= 6 cm)    Coordination:       - Finger to Nose: normal       - Rapid  Alternating Movements: normal    Balance Testing:       - Romberg: normal       - Backward Tandem: normal         Gait:  Walk in hallway at normal speed: Able  (write out first drop down)    Cognitive:  Immediate object recall: 4/4  Recall 4 objects at 5 minutes:4/4  Reverse months of the year: Yes  No  Spell world backwards: Yes  No  Backwards number string:  Yes   Three stage command:  Yes  No      Time spent in one-on-one evaluation and discussion with patient regarding nature of problem, course, prior treatments, and therapeutic options, 75% of this 60 minute visit  was spent in counseling including this patients personal symptom triggers and education thereof.    Amanda Arias

## 2017-06-28 NOTE — NURSING NOTE
"Chief Complaint   Patient presents with     Clinic Care Coordination - Initial     new       Vitals:    06/28/17 1702   BP: (!) 160/99   Pulse: 64   Temp: 98.2  F (36.8  C)   TempSrc: Oral   SpO2: 99%   Weight: 190 lb 8 oz   Height: 5' 11\"       Body mass index is 26.57 kg/(m^2).    Frieda AGUDELO LPN                          "

## 2017-06-29 ASSESSMENT — ANXIETY QUESTIONNAIRES: GAD7 TOTAL SCORE: 6

## 2017-06-29 ASSESSMENT — PATIENT HEALTH QUESTIONNAIRE - PHQ9: SUM OF ALL RESPONSES TO PHQ QUESTIONS 1-9: 9

## 2018-08-13 ENCOUNTER — ALLIED HEALTH/NURSE VISIT (OUTPATIENT)
Dept: NURSING | Facility: CLINIC | Age: 48
End: 2018-08-13
Payer: COMMERCIAL

## 2018-08-13 VITALS — HEART RATE: 82 BPM | DIASTOLIC BLOOD PRESSURE: 94 MMHG | OXYGEN SATURATION: 99 % | SYSTOLIC BLOOD PRESSURE: 158 MMHG

## 2018-08-13 DIAGNOSIS — R03.0 ELEVATED BLOOD-PRESSURE READING WITHOUT DIAGNOSIS OF HYPERTENSION: Primary | ICD-10-CM

## 2018-08-13 PROCEDURE — 99207 ZZC NO CHARGE NURSE ONLY: CPT

## 2018-08-13 NOTE — PROGRESS NOTES
Colten Lucas     NURSING ASSESSMENT/PLAN:  Blood pressure reading today is not at the provider specified goal of <140/90.    Current blood pressure medication(s):  none  1.  The patient will await provider recommendations.     2.  We will not be checking a metabolic lab panel today.      3.  Follow up instructions include:     Next Provider visit: Follow up in 1 week - patient was offered an appointment with provider for tomorrow, however, declined.    SUBJECTIVE:                                                    [facility] some [facility]. It was noted at that time that his BP was in the 160's over 90's. They recommended that he follow up with his primary clinic.      OBJECTIVE:                                                    Is pulse 55 or greater? - Yes  Pulse Readings from Last 1 Encounters:   08/13/18 82     Today's BP completed using cuff size: large on left side  arm.  BP Readings from Last 3 Encounters:   08/13/18 (!) 158/94   06/28/17 (!) 160/99   06/16/17 138/78       No current outpatient prescriptions on file.     No results found for: POTASSIUM  No results found for: CR  No results found for: BUN  No results found for: GFRESTIMATED   A baseline potassium, creatinine, BUN, GFR has not been done within past 12 months    Education:  general discussion/verbal explanation  Limit dietary sodium intake between 1500-2000mg every day  Regular aerobic exercise.  Discussed habit change regarding diet/weight loss, alcohol use and caffeine intake  These interventions were used: Empathy/Understanding, Permission to raise concern or advise and Open ended questions   Reviewed signs and symptoms of high BP requiring emergent care.  Patient was given an opportunity to ask questions.    Patient verbalized understanding of this plan and is agreeable.    Thania Villela RN

## 2018-08-13 NOTE — MR AVS SNAPSHOT
After Visit Summary   8/13/2018    Colten Lucas    MRN: [id number]           Patient Information     Date Of Birth          1970        Visit Information        Provider Department      8/13/2018 3:00 PM [facility]        Today's Diagnoses     Elevated blood-pressure reading without diagnosis of hypertension    -  1       Follow-ups after your visit        Follow-up notes from your care team     Return in about 1 week (around 8/20/2018) for BP Recheck.      Your next 10 appointments already scheduled     Aug 20, 2018  3:00 PM CDT   Office Visit with Wei Martinez MD   [facility] ([facility])    [address] [id number] [phone]           Bring a current list of meds and any records pertaining to this visit. For Physicals, please bring immunization records and any forms needing to be filled out. Please arrive 10 minutes early to complete paperwork.              Who to contact     If you have questions or need follow up information about today's clinic visit or your schedule please contact [facility] directly at [phone].  Normal or non-critical lab and imaging results will be communicated to you by MyChart, letter or phone within 4 business days after the clinic has received the results. If you do not hear from us within 7 days, please contact the clinic through [employer] [place] or phone. If you have a critical or abnormal lab result, we will notify you by phone as soon as possible.  Submit refill requests through [employer] or call your pharmacy and they will forward the refill request to us. Please allow 3 business days for your refill to be completed.          Additional Information About Your Visit        [employer] Information     [employer] lets you send messages to your doctor, view your test results, renew your prescriptions, schedule appointments and more. To sign up, go to  [email or web address] . Click on \"Log in\" on the left side of the screen, which will take you to the Welcome page. Then click on \"Sign up Now\" on the right side of the page.     You will be asked to enter the access code listed below, as well as some personal information. Please follow the directions to create your username and password.     Your access code is: [email or web address]  Expires: 2018  3:28 PM     Your access code will  in 90 days. If you need help or a new code, please call your [place] clinic or [phone].        Care EveryWhere ID     This is your Care EveryWhere ID. This could be used by other organizations to access your [place] medical records  [id number]        Your Vitals Were     Pulse Pulse Oximetry                82 99%           Blood Pressure from Last 3 Encounters:   18 (!) 158/94   17 (!) 160/99   17 138/78    Weight from Last 3 Encounters:   17 190 lb 8 oz (86.4 kg)   17 186 lb (84.4 kg)   17 188 lb (85.3 kg)              Today, you had the following     No orders found for display       Primary Care Provider Fax #    Physician No Ref-Primary [phone]       No address on file        Equal Access to Services     [facility] LAKESHIA : Hadii aad ku hadasho Soforrest, waaxda luqadaha, qaybta kaalmada darshan, aracelis read . So [facility] [phone].    ATENCIÓN: Si habla español, tiene a joya disposición servicios gratuitos de asistencia lingüística. Emekaame al [phone].    We comply with applicable federal civil rights laws and [place] laws. We do not discriminate on the basis of race, color, national origin, age, disability, sex, sexual orientation, or gender identity.            Thank you!     Thank you for choosing [facility]  for your care. Our goal is always to provide you with excellent care. Hearing back from our patients is one way we can continue to improve our services. Please take a few minutes " to complete the written survey that you may receive in the mail after your visit with us. Thank you!             Your Updated Medication List - Protect others around you: Learn how to safely use, store and throw away your medicines at www.disposemymeds.org.      Notice  As of 8/13/2018  3:28 PM    You have not been prescribed any medications.

## 2018-08-20 ENCOUNTER — OFFICE VISIT (OUTPATIENT)
Dept: FAMILY MEDICINE | Facility: CLINIC | Age: 48
End: 2018-08-20
Payer: COMMERCIAL

## 2018-08-20 VITALS
OXYGEN SATURATION: 99 % | SYSTOLIC BLOOD PRESSURE: 144 MMHG | DIASTOLIC BLOOD PRESSURE: 96 MMHG | WEIGHT: 190 LBS | HEART RATE: 64 BPM | BODY MASS INDEX: 26.6 KG/M2 | TEMPERATURE: 98.1 F | HEIGHT: 71 IN

## 2018-08-20 DIAGNOSIS — Z13.0 SCREENING FOR ENDOCRINE, METABOLIC, AND IMMUNITY DISORDER: ICD-10-CM

## 2018-08-20 DIAGNOSIS — R03.0 ELEVATED BLOOD PRESSURE READING WITHOUT DIAGNOSIS OF HYPERTENSION: Primary | ICD-10-CM

## 2018-08-20 DIAGNOSIS — Z13.29 SCREENING FOR ENDOCRINE, METABOLIC, AND IMMUNITY DISORDER: ICD-10-CM

## 2018-08-20 DIAGNOSIS — Z12.11 SPECIAL SCREENING FOR MALIGNANT NEOPLASMS, COLON: ICD-10-CM

## 2018-08-20 DIAGNOSIS — Z13.228 SCREENING FOR ENDOCRINE, METABOLIC, AND IMMUNITY DISORDER: ICD-10-CM

## 2018-08-20 DIAGNOSIS — Z13.1 SCREENING FOR DIABETES MELLITUS: ICD-10-CM

## 2018-08-20 DIAGNOSIS — Z13.220 SCREENING FOR LIPID DISORDERS: ICD-10-CM

## 2018-08-20 PROBLEM — S06.0X0A CONCUSSION WITHOUT LOSS OF CONSCIOUSNESS, INITIAL ENCOUNTER: Status: RESOLVED | Noted: 2017-06-16 | Resolved: 2018-08-20

## 2018-08-20 PROCEDURE — 99214 OFFICE O/P EST MOD 30 MIN: CPT | Performed by: FAMILY MEDICINE

## 2018-08-20 NOTE — PROGRESS NOTES
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    Mom with htn    Pt reports that he is going through a ton with his wife who has stage 4 breast cancer.   They have a 12 year old daughter.   He has little time for himself.      ROS:  General, neuro, sleep, psych, musculoskeletal system otherwise negative.    BP (!) 144/96  Pulse 64  Temp 98.1  F (36.7  C) (Oral)  Ht 5' 11\" (1.803 m)  Wt 190 lb (86.2 kg)  SpO2 99%  BMI 26.5 kg/m2    GENERAL: healthy, alert and no distress  EYES: Eyes grossly normal to inspection, PERRL and conjunctivae and sclerae normal  NECK: no adenopathy, no asymmetry, masses, or scars and thyroid normal to palpation  RESP: lungs clear to auscultation - no rales, rhonchi or wheezes  CV: regular rate and rhythm, normal S1 S2, no S3 or S4, no murmur, click or rub, no peripheral edema and peripheral pulses strong  MS: no gross musculoskeletal defects noted, no edema  SKIN: no suspicious lesions or rashes  NEURO: Normal strength and tone, mentation intact and speech normal  PSYCH: mentation appears normal, affect normal/bright    ASSESSMENT:  1. Elevated blood pressure reading without diagnosis of hypertension  Will have patient increase vigilance with exercise, diet and have screening labs done.   Pt will get a cuff and start checking his BP at home and mychart back to me        2. Screening for lipid disorders    - Lipid Profile (Chol, Trig, HDL, LDL calc); Future    3. Screening for diabetes mellitus    - Comprehensive metabolic panel (BMP + Alb, Alk Phos, ALT, AST, Total. Bili, TP); Future    4. Screening for endocrine, metabolic, and immunity disorder    - UA without Microscopic; Future  - **TSH with free " T4 reflex FUTURE 2mo; Future    5. Special screening for malignant neoplasms, colon    - GASTROENTEROLOGY ADULT REF PROCEDURE ONLY     Greater than 25 minutes spent with patient and family discussing risks and benefits and management with possible outcomes regarding this issue with greater than 50% in counseling for medical decision making and coordination of care.

## 2018-08-20 NOTE — MR AVS SNAPSHOT
After Visit Summary   8/20/2018    Colten Lucas    MRN: [id number]           Patient Information     Date Of Birth          1970        Visit Information        Provider Department      8/20/2018 3:00 PM Wei Martinez MD [facility]        Today's Diagnoses     Screening for lipid disorders    -  1    Screening for diabetes mellitus        Screening for endocrine, metabolic, and immunity disorder        Special screening for malignant neoplasms, colon           Follow-ups after your visit        Additional Services     GASTROENTEROLOGY ADULT REF PROCEDURE ONLY       Last Lab Result: No results found for: CR  Body mass index is 26.5 kg/(m^2).     Needed:  No  Language:  English    Patient will be contacted to schedule procedure.     Please be aware that coverage of these services is subject to the terms and limitations of your health insurance plan.  Call member services at your health plan with any benefit or coverage questions.  Any procedures must be performed at a [place] facility OR coordinated by your clinic's referral office.    Please bring the following with you to your appointment:    (1) Any X-Rays, CTs or MRIs which have been performed.  Contact the facility where they were done to arrange for  prior to your scheduled appointment.    (2) List of current medications   (3) This referral request   (4) Any documents/labs given to you for this referral                  Future tests that were ordered for you today     Open Future Orders        Priority Expected Expires Ordered    **TSH with free T4 reflex FUTURE 2mo Routine 10/19/2018 12/18/2018 8/20/2018    Comprehensive metabolic panel (BMP + Alb, Alk Phos, ALT, AST, Total. Bili, TP) Routine  8/20/2019 8/20/2018    Lipid Profile (Chol, Trig, HDL, LDL calc) Routine  8/20/2019 8/20/2018    UA without Microscopic Routine  8/20/2019 8/20/2018            Who to contact     If you have questions or  "need follow up information about today's clinic visit or your schedule please contact [facility] directly at [phone].  Normal or non-critical lab and imaging results will be communicated to you by [employer], letter or phone within 4 business days after the clinic has received the results. If you do not hear from us within 7 days, please contact the clinic through [employer] or phone. If you have a critical or abnormal lab result, we will notify you by phone as soon as possible.  Submit refill requests through [employer] or call your pharmacy and they will forward the refill request to us. Please allow 3 business days for your refill to be completed.          Additional Information About Your Visit        [employer] Information     [employer] lets you send messages to your doctor, view your test results, renew your prescriptions, schedule appointments and more. To sign up, go to [email or web address] . Click on \"Log in\" on the left side of the screen, which will take you to the Welcome page. Then click on \"Sign up Now\" on the right side of the page.     You will be asked to enter the access code listed below, as well as some personal information. Please follow the directions to create your username and password.     Your access code is: [email or web address]  Expires: 2018  3:19 PM     Your access code will  in 90 days. If you need help or a new code, please call your [facility] clinic or [phone].        Care EveryWhere ID     This is your Care EveryWhere ID. This could be used by other organizations to access your [facility] medical records  [id number]        Your Vitals Were     Pulse Temperature Height Pulse Oximetry BMI (Body Mass Index)       64 98.1  F (36.7  C) (Oral) 5' 11\" (1.803 m) 99% 26.5 kg/m2        Blood Pressure from Last 3 Encounters:   18 (!) 144/96   18 (!) 158/94   17 (!) 160/99    Weight from Last 3 Encounters:   18 190 lb (86.2 kg)   17 190 lb 8 oz (86.4 " kg)   06/16/17 186 lb (84.4 kg)              We Performed the Following     GASTROENTEROLOGY ADULT REF PROCEDURE ONLY        Primary Care Provider Fax #    Physician No Ref-Primary [phone]       No address on file        Equal Access to Services     MARLENALEXA LAKESHIA : Ney piper tracy jonny Julian, wayingda luqadaha, lissetteta kaalmada darshan, aracelis sanders jasielanca perez laLudyvira watson. So [facility] [phone].    ATENCIÓN: Si habla español, tiene a joya disposición servicios gratuitos de asistencia lingüística. Llame al [phone].    We comply with applicable federal civil rights laws and [place] laws. We do not discriminate on the basis of race, color, national origin, age, disability, sex, sexual orientation, or gender identity.            Thank you!     Thank you for choosing [facility]  for your care. Our goal is always to provide you with excellent care. Hearing back from our patients is one way we can continue to improve our services. Please take a few minutes to complete the written survey that you may receive in the mail after your visit with us. Thank you!             Your Updated Medication List - Protect others around you: Learn how to safely use, store and throw away your medicines at www.disposemymeds.org.      Notice  As of 8/20/2018  3:37 PM    You have not been prescribed any medications.

## 2018-08-23 DIAGNOSIS — Z13.0 SCREENING FOR ENDOCRINE, METABOLIC, AND IMMUNITY DISORDER: ICD-10-CM

## 2018-08-23 DIAGNOSIS — Z13.29 SCREENING FOR ENDOCRINE, METABOLIC, AND IMMUNITY DISORDER: ICD-10-CM

## 2018-08-23 DIAGNOSIS — Z13.228 SCREENING FOR ENDOCRINE, METABOLIC, AND IMMUNITY DISORDER: ICD-10-CM

## 2018-08-23 DIAGNOSIS — Z13.1 SCREENING FOR DIABETES MELLITUS: ICD-10-CM

## 2018-08-23 DIAGNOSIS — Z13.220 SCREENING FOR LIPID DISORDERS: ICD-10-CM

## 2018-08-23 LAB
ALBUMIN SERPL-MCNC: 4.4 G/DL (ref 3.4–5)
ALBUMIN UR-MCNC: NEGATIVE MG/DL
ALP SERPL-CCNC: 78 U/L (ref 40–150)
ALT SERPL W P-5'-P-CCNC: 22 U/L (ref 0–70)
ANION GAP SERPL CALCULATED.3IONS-SCNC: 7 MMOL/L (ref 3–14)
APPEARANCE UR: CLEAR
AST SERPL W P-5'-P-CCNC: 26 U/L (ref 0–45)
BILIRUB SERPL-MCNC: 1.2 MG/DL (ref 0.2–1.3)
BILIRUB UR QL STRIP: NEGATIVE
BUN SERPL-MCNC: 21 MG/DL (ref 7–30)
CALCIUM SERPL-MCNC: 9.2 MG/DL (ref 8.5–10.1)
CHLORIDE SERPL-SCNC: 108 MMOL/L (ref 94–109)
CHOLEST SERPL-MCNC: 208 MG/DL
CO2 SERPL-SCNC: 25 MMOL/L (ref 20–32)
COLOR UR AUTO: YELLOW
CREAT SERPL-MCNC: 1 MG/DL (ref 0.66–1.25)
GFR SERPL CREATININE-BSD FRML MDRD: 79 ML/MIN/1.7M2
GLUCOSE SERPL-MCNC: 90 MG/DL (ref 70–99)
GLUCOSE UR STRIP-MCNC: NEGATIVE MG/DL
HDLC SERPL-MCNC: 36 MG/DL
HGB UR QL STRIP: NEGATIVE
KETONES UR STRIP-MCNC: NEGATIVE MG/DL
LDLC SERPL CALC-MCNC: 145 MG/DL
LEUKOCYTE ESTERASE UR QL STRIP: NEGATIVE
NITRATE UR QL: NEGATIVE
NONHDLC SERPL-MCNC: 172 MG/DL
PH UR STRIP: 5 PH (ref 5–7)
POTASSIUM SERPL-SCNC: 4.4 MMOL/L (ref 3.4–5.3)
PROT SERPL-MCNC: 7.6 G/DL (ref 6.8–8.8)
SODIUM SERPL-SCNC: 140 MMOL/L (ref 133–144)
SOURCE: NORMAL
SP GR UR STRIP: 1.02 (ref 1–1.03)
TRIGL SERPL-MCNC: 134 MG/DL
TSH SERPL DL<=0.005 MIU/L-ACNC: 1.38 MU/L (ref 0.4–4)
UROBILINOGEN UR STRIP-ACNC: 0.2 EU/DL (ref 0.2–1)

## 2018-08-23 PROCEDURE — 36415 COLL VENOUS BLD VENIPUNCTURE: CPT | Performed by: FAMILY MEDICINE

## 2018-08-23 PROCEDURE — 80061 LIPID PANEL: CPT | Performed by: FAMILY MEDICINE

## 2018-08-23 PROCEDURE — 84443 ASSAY THYROID STIM HORMONE: CPT | Performed by: FAMILY MEDICINE

## 2018-08-23 PROCEDURE — 81003 URINALYSIS AUTO W/O SCOPE: CPT | Performed by: FAMILY MEDICINE

## 2018-08-23 PROCEDURE — 80053 COMPREHEN METABOLIC PANEL: CPT | Performed by: FAMILY MEDICINE

## 2019-03-08 ENCOUNTER — HOSPITAL ENCOUNTER (OUTPATIENT)
Facility: CLINIC | Age: 49
Discharge: HOME | End: 2019-03-08
Attending: INTERNAL MEDICINE | Admitting: INTERNAL MEDICINE
Payer: COMMERCIAL

## 2019-03-08 VITALS
DIASTOLIC BLOOD PRESSURE: 82 MMHG | HEART RATE: 54 BPM | SYSTOLIC BLOOD PRESSURE: 114 MMHG | RESPIRATION RATE: 18 BRPM | OXYGEN SATURATION: 98 %

## 2019-03-08 LAB — COLONOSCOPY: NORMAL

## 2019-03-08 PROCEDURE — 25000128 H RX IP 250 OP 636: Performed by: INTERNAL MEDICINE

## 2019-03-08 PROCEDURE — 45378 DIAGNOSTIC COLONOSCOPY: CPT | Performed by: INTERNAL MEDICINE

## 2019-03-08 PROCEDURE — G0121 COLON CA SCRN NOT HI RSK IND: HCPCS | Performed by: INTERNAL MEDICINE

## 2019-03-08 PROCEDURE — G0500 MOD SEDAT ENDO SERVICE >5YRS: HCPCS | Performed by: INTERNAL MEDICINE

## 2019-03-08 RX ORDER — ONDANSETRON 4 MG/1
4 TABLET, ORALLY DISINTEGRATING ORAL EVERY 6 HOURS PRN
Status: DISCONTINUED | OUTPATIENT
Start: 2019-03-08 | End: 2019-03-08 | Stop reason: HOSPADM

## 2019-03-08 RX ORDER — FLUMAZENIL 0.1 MG/ML
0.2 INJECTION, SOLUTION INTRAVENOUS
Status: DISCONTINUED | OUTPATIENT
Start: 2019-03-08 | End: 2019-03-08 | Stop reason: HOSPADM

## 2019-03-08 RX ORDER — FENTANYL CITRATE 50 UG/ML
INJECTION, SOLUTION INTRAMUSCULAR; INTRAVENOUS PRN
Status: DISCONTINUED | OUTPATIENT
Start: 2019-03-08 | End: 2019-03-08 | Stop reason: HOSPADM

## 2019-03-08 RX ORDER — ONDANSETRON 2 MG/ML
4 INJECTION INTRAMUSCULAR; INTRAVENOUS
Status: COMPLETED | OUTPATIENT
Start: 2019-03-08 | End: 2019-03-08

## 2019-03-08 RX ORDER — ONDANSETRON 2 MG/ML
4 INJECTION INTRAMUSCULAR; INTRAVENOUS EVERY 6 HOURS PRN
Status: DISCONTINUED | OUTPATIENT
Start: 2019-03-08 | End: 2019-03-08 | Stop reason: HOSPADM

## 2019-03-08 RX ORDER — NALOXONE HYDROCHLORIDE 0.4 MG/ML
.1-.4 INJECTION, SOLUTION INTRAMUSCULAR; INTRAVENOUS; SUBCUTANEOUS
Status: DISCONTINUED | OUTPATIENT
Start: 2019-03-08 | End: 2019-03-08 | Stop reason: HOSPADM

## 2019-03-08 RX ORDER — LIDOCAINE 40 MG/G
CREAM TOPICAL
Status: DISCONTINUED | OUTPATIENT
Start: 2019-03-08 | End: 2019-03-08 | Stop reason: HOSPADM

## 2019-03-08 NOTE — H&P
"Pre-Endoscopy History and Physical     Colten Lucas MRN# [id number]   YOB: 1970 Age: 49 year old     Date of Procedure: 3/8/2019  Primary care provider: Wei Martinez  Type of Endoscopy: Colonoscopy with possible biopsy, possible polypectomy  Reason for Procedure: screen  Type of Anesthesia Anticipated: Conscious Sedation    HPI:    Colten is a 49 year old male who will be undergoing the above procedure.      A history and physical has been performed. The patient's medications and allergies have been reviewed. The risks and benefits of the procedure and the sedation options and risks were discussed with the patient.  All questions were answered and informed consent was obtained.      He denies a personal or family history of anesthesia complications or bleeding disorders.     Patient Active Problem List   Diagnosis     Tinnitus, bilateral        Past Medical History:   Diagnosis Date     Hypertension     not on meds; watching BP's        Past Surgical History:   Procedure Laterality Date     C NONSPECIFIC PROCEDURE  ~1973    tonsillectomy       Social History     Tobacco Use     Smoking status: Never Smoker     Smokeless tobacco: Never Used   Substance Use Topics     Alcohol use: Yes     Comment: 2 glasses of wine per wk        History reviewed. No pertinent family history.    Prior to Admission medications    Not on File       Allergies   Allergen Reactions     Penicillins Rash        REVIEW OF SYSTEMS:   5 point ROS negative except as noted above in HPI, including Gen., Resp., CV, GI &  system review.    PHYSICAL EXAM:   There were no vitals taken for this visit. Estimated body mass index is 26.5 kg/m  as calculated from the following:    Height as of 8/20/18: 1.803 m (5' 11\").    Weight as of 8/20/18: 86.2 kg (190 lb).   GENERAL APPEARANCE: alert, and oriented  MENTAL STATUS: alert  AIRWAY EXAM: Mallampatti Class I (visualization of the soft palate, fauces, uvula, anterior and " posterior pillars)  RESP: lungs clear to auscultation - no rales, rhonchi or wheezes  CV: regular rates and rhythm  DIAGNOSTICS:    Not indicated    IMPRESSION   ASA Class 1 - Healthy patient, no medical problems    PLAN:   Plan for Colonoscopy with possible biopsy, possible polypectomy. We discussed the risks, benefits and alternatives and the patient wished to proceed.    The above has been forwarded to the consulting provider.      Signed Electronically by: Yung Jones  March 8, 2019

## 2019-03-08 NOTE — LETTER
February 18, 2019      Colten Lucas  [address] [place] [address]        Dear Colten,     Thank you for choosing [facility]. You are scheduled for the following service:     Date:  Thursday, March 7, 2019            Procedure:  COLONOSCOPY  Doctor:        Dr. Yung Jones  Arrival Time:  0900am  *Check in at Emergency/Endoscopy desk*  Procedure Time:  0930am     Location:   [facility]        Endoscopy Department, First Floor (Enter through ER Doors) *        [address] [place], [place] [address]      [phone] or [phone] (Formerly [facility]) to reschedule      MIRALAX -GATORADE  PREP  Colonoscopy is the most accurate test to detect colon polyps and colon cancer; and the only test where polyps can be removed. During this procedure, a doctor examines the lining of your large intestine and rectum through a flexible tube.   Transportation  Arrange for a ride for the day of your procedure with a responsible adult.  A taxi ride is not an option unless you are accompanied by a responsible adult. If you fail to arrange transportation with a responsible adult, your procedure will be cancelled and rescheduled.    Purchase the  following supplies at your local pharmacy:  - 2 (two) bisacodyl tablets: each tablet contains 5 mg.  (Dulcolax  laxative NOT Dulcolax  stool softener)   - 1 (one) 8.3 oz bottle of Polyethylene Glycol (PEG) 3350 Powder   (MiraLAX , Smooth LAX , ClearLAX  or equivalent)  - 64 oz Gatorade    Regular Gatorade, Gatorade G2 , Powerade , Powerade Zero  or Pedialyte  is acceptable. Red colored flavors are not allowed; all other colors (yellow, green, orange, purple and blue) are okay. It is also okay to buy two 2.12 oz packets of powdered Gatorade that can be mixed with water to a total volume of 64 oz of liquid.  - 1 (one) 10 oz bottle of Magnesium Citrate (Red colored flavors are not allowed)  It is also okay for you to use a  0.5 oz package of powdered magnesium citrate (17 g) mixed with 10 oz of water.      PREPARATION FOR COLONOSCOPY    7 days before:    Discontinue fiber supplements and medications containing iron. This includes Metamucil  and Fibercon ; and multivitamins with iron.    3 days before:    Begin a low-fiber diet. A low-fiber diet helps making the cleanout more effective.     Examples of a low-fiber diet include (but are not limited to): white bread, white rice, pasta, crackers, fish, chicken, eggs, ground beef, creamy peanut butter, cooked/steamed/boiled vegetables, canned fruit, bananas, melons, milk, plain yogurt cheese, salad dressing and other condiments.     The following are not allowed on a low-fiber diet: seeds, nuts, popcorn, bran, whole wheat, corn, quinoa, raw fruits and vegetables, berries and dried fruit, beans and lentils.    For additional details on low-fiber diet, please refer to the table on the last page.    2 days before:    Continue the low-fiber diet.     Drink at least 8 glasses of water throughout the day.     Stop eating solid foods at 11:45 pm.    1 day before:    In the morning: begin a clear liquid diet (liquids you can see through).     Examples of a clear liquid diet include: water, clear broth or bouillon, Gatorade, Pedialyte or Powerade, carbonated and non-carbonated soft drinks (Sprite , 7-Up , ginger ale), strained fruit juices without pulp (apple, white grape, white cranberry), Jell-O  and popsicles.     The following are not allowed on a clear liquid diet: red liquids, alcoholic beverages, dairy products (milk, creamer, and yogurt), protein shakes, creamy broths, juice with pulp and chewing tobacco.    At noon: take 2 (two) bisacodyl tablets     At 4 (and no later than 6pm): start drinking the Miralax-Gatorade preparation (8.3 oz of Miralax mixed with 64 oz of Gatorade in a large pitcher). Drink 1(one) 8 oz glass every 15 minutes thereafter, until the mixture is gone.    COLON  CLEANSING TIPS: drink adequate amounts of fluids before and after your colon cleansing to prevent dehydration. Stay near a toilet because you will have diarrhea. Even if you are sitting on the toilet, continue to drink the cleansing solution every 15 minutes. If you feel nauseous or vomit, rinse your mouth with water, take a 15 to 30-minute-break and then continue drinking the solution. You will be uncomfortable until the stool has flushed from your colon (in about 2 to 4 hours). You may feel chilled.    Day of your procedure  You may take all of your morning medications including blood pressure medications, blood thinners (if you have not been instructed to stop these by our office), methadone, anti-seizure medications with sips of water 3 hours prior to your procedure or earlier. Do not take insulin or vitamins prior to your procedure. Continue the clear liquid diet.       4 hours prior: drink 10 oz of magnesium citrate. It may be easier to drink it with a straw.    STOP consuming all liquids after that.     Do not take anything by mouth during this time.     Allow extra time to travel to your procedure as you may need to stop and use a restroom along the way.    You are ready for the procedure, if you followed all instructions and your stool is no longer formed, but clear or yellow liquid. If you are unsure whether your colon is clean, please call our office at [phone] before you leave for your appointment.    Bring the following to your procedure:  - Insurance Card/Photo ID.   - List of current medications including over-the-counter medications and supplements.   - Your rescue inhaler if you currently use one to control asthma.      Canceling or rescheduling your appointment:   If you must cancel or reschedule your appointment, please call [phone] as soon as possible.      COLONOSCOPY PRE-PROCEDURE CHECKLIST    If you have diabetes, ask your regular doctor for diet and medication restrictions.  If you  take an anticoagulant or anti-platelet medication (such as Coumadin , Lovenox , Pradaxa , Xarelto , Eliquis , etc.), please call your primary doctor for advice on holding this medication.  If you take aspirin you may continue to do so.  If you are or may be pregnant, please discuss the risks and benefits of this procedure with your doctor.        What happens during a colonoscopy?    Plan to spend up to two hours, starting at registration time, at the endoscopy center the day of your procedure. The colonoscopy takes an average of 15 to 30 minutes. Recovery time is about 30 minutes.      Before the exam:    You will change into a gown.    Your medical history and medication list will be reviewed with you, unless that has been done over the phone prior to the procedure.     A nurse will insert an intravenous (IV) line into your hand or arm.    The doctor will meet with you and will give you a consent form to sign.  During the exam:     Medicine will be given through the IV line to help you relax.     Your heart rate and oxygen levels will be monitored. If your blood pressure is low, you may be given fluids through the IV line.     The doctor will insert a flexible hollow tube, called a colonoscope, into your rectum. The scope will be advanced slowly through the large intestine (colon).    You may have a feeling of fullness or pressure.     If an abnormal tissue or a polyp is found, the doctor may remove it through the endoscope for closer examination, or biopsy. Tissue removal is painless    After the exam:           Any tissue samples removed during the exam will be sent to a lab for evaluation. It may take 5-7 working days for you to be notified of the results.     A nurse will provide you with complete discharge instructions before you leave the endoscopy center. Be sure to ask the nurse for specific instructions if you take blood thinners such as Aspirin, Coumadin or Plavix.     The doctor will prepare a full  report for you and for the physician who referred you for the procedure.     Your doctor will talk with you about the initial results of your exam.      Medication given during the exam will prohibit you from driving for the rest of the day.     Following the exam, you may resume your normal diet. Your first meal should be light, no greasy foods. Avoid alcohol until the next day.     You may resume your regular activities the day after the procedure.         LOW-FIBER DIET    Foods RECOMMENDED Foods to AVOID   Breads, Cereal, Rice and Pasta:   White bread, rolls, biscuits, croissant and michelle toast.   Waffles, Tamazight toast and pancakes.   White rice, noodles, pasta, macaroni and peeled cooked potatoes.   Plain crackers and saltines.   Cooked cereals: farina, cream of rice.   Cold cereals: Puffed Rice , Rice Krispies , Corn Flakes  and Special K    Breads, Cereal, Rice and Pasta:   Breads or rolls with nuts, seeds or fruit.   Whole wheat, pumpernickel, rye breads and cornbread.   Potatoes with skin, brown or wild rice, and kasha (buckwheat).     Vegetables:   Tender cooked and canned vegetables without seeds: carrots, asparagus tips, green or wax beans, pumpkin, spinach, lima beans. Vegetables:   Raw or steamed vegetables.   Vegetables with seeds.   Sauerkraut.   Winter squash, peas, broccoli, Brussel sprouts, cabbage, onions, cauliflower, baked beans, peas and corn.   Fruits:   Strained fruit juice.   Canned fruit, except pineapple.   Ripe bananas and melon. Fruits:   Prunes and prune juice.   Raw fruits.   Dried fruits: figs, dates and raisins.   Milk/Dairy:   Milk: plain or flavored.   Yogurt, custard and ice cream.   Cheese and cottage cheese Milk/Dairy:     Meat and other proteins:   ground, well-cooked tender beef, lamb, ham, veal, pork, fish, poultry and organ meats.   Eggs.   Peanut butter without nuts. Meat and other proteins:   Tough, fibrous meats with gristle.   Dry beans, peas and lentils.   Peanut  butter with nuts.   Tofu.   Fats, Snack, Sweets, Condiments and Beverages:   Margarine, butter, oils, mayonnaise, sour cream and salad dressing, plain gravy.   Sugar, hard candy, clear jelly, honey and syrup.   Spices, cooked herbs, bouillon, broth and soups made with allowed vegetable, ketchup and mustard.   Coffee, tea and carbonated drinks.   Plain cakes, cookies and pretzels.   Gelatin, plain puddings, custard, ice cream, sherbet and popsicles. Fats, Snack, Sweets, Condiments and Beverages:   Nuts, seeds and coconut.   Jam, marmalade and preserves.   Pickles, olives, relish and horseradish.   All desserts containing nuts, seeds, dried fruit and coconut; or made from whole grains or bran.   Candy made with nuts or seeds.   Popcorn.                     DIRECTIONS TO THE ENDOSCOPY DEPARTMENT     From the north ([facility])  Take [address] South, exit on [address] [address]. Get into the left hand obey, turn left (east), go one-half mile to [address] and turn left. Go north to the first stoplight, take a right on [address] and follow it to the Emergency entrance.    From the south ([facility])  Take 35N to the [address]E split and exit on [address] [address]. On [address] [address], turn left (west) to [address]. Turn right (north) on [address]. Go north to the first stoplight, take a right on [address] and follow it to the Emergency entrance.    From the east via 35E ([facility])  Take [address] south to [address] [address] exit. Turn right on [address] . Go west to [address]. Turn right (north) on [address]. Go to the first stoplight, take a right and follow on [address] to the Emergency entrance.    From the east via [address] ([facility])  Take [address] to [address]. Turn left (south) on [address] to [address]. Turn left (east) on [address] and follow it to the Emergency entrance.    From the west via [address] ([place])  Take [address] east to [address]. Turn right (south) on [address] to [email or web address]. Turn left (east) on [email or web address] and follow it to the Emergency entrance.      Sincerely,        [facility] Endoscopy Department

## 2019-03-08 NOTE — DISCHARGE INSTRUCTIONS
The patient has received a copy of the Provation  report the doctor has written and discharge instructions have been discussed with the patient and responsible adult.  All questions were addressed and answered prior to patient discharge.      Eating a High-Fiber Diet  Fiber is what gives strength and structure to plants. Most grains, beans, vegetables, and fruits contain fiber. Foods rich in fiber are often low in calories and fat, and they fill you up more. They may also reduce your risks for certain health problems. To find out the amount of fiber in canned, packaged, or frozen foods, read the  Nutrition Facts  label. It tells you how much fiber is in a serving.      Types of Fiber and Their Benefits  There are two types of fiber: insoluble and soluble. They both aid digestion and help you maintain a healthy weight.  Insoluble fiber: This is found in whole grains, cereals, certain fruits and vegetables (such as apple skin, corn, and carrots). Insoluble fiber may prevent constipation and reduce the risk of certain types of cancer.   Soluble fiber: This type of fiber is in oats, beans, and certain fruits and vegetables (such as strawberries and peas). Soluble fiber can reduce cholesterol (which may help lower the risk of heart disease), and helps control blood sugar levels.  Look for High-Fiber Foods  Whole-grain breads and cereals: Try to eat 6-8 ounces a day. Include wheat and oat bran cereals, whole-wheat muffins or toast, and corn tortillas in your meals.  Fruits: Try to eat 2 cups a day. Apples, oranges, strawberries, pears, and bananas are good sources. (Note: Fruit juice is low in fiber.)  Vegetables: Try to eat 3 cups a day. Add asparagus, carrots, broccoli, peas, and corn to your meals.  Legumes (beans): One cup of cooked lentils gives you over 15 grams of fiber. Try navy beans, lentils, and chickpeas.  Seeds:  A small handful of seeds gives you about 3 grams of fiber. Try sunflower seeds.    Keep Track of  Your Fiber  A healthy diet includes 31 grams of fiber a day if you have a 2,000-calorie diet. Keep track of how much fiber you eat. Start by reading food labels. Then eat a variety of foods high in fiber. Ask your doctor about supplemental fiber products.            5433-5951 [employer], [address] [place] [address], [place] [address]. All rights reserved. This information is not intended as a substitute for professional medical care. Always follow your healthcare professional's instructions.    Eating a High-Fiber Diet  Fiber is what gives strength and structure to plants. Most grains, beans, vegetables, and fruits contain fiber. Foods rich in fiber are often low in calories and fat, and they fill you up more. They may also reduce your risks for certain health problems. To find out the amount of fiber in canned, packaged, or frozen foods, read the  Nutrition Facts  label. It tells you how much fiber is in a serving.      Types of Fiber and Their Benefits  There are two types of fiber: insoluble and soluble. They both aid digestion and help you maintain a healthy weight.  Insoluble fiber: This is found in whole grains, cereals, certain fruits and vegetables (such as apple skin, corn, and carrots). Insoluble fiber may prevent constipation and reduce the risk of certain types of cancer.   Soluble fiber: This type of fiber is in oats, beans, and certain fruits and vegetables (such as strawberries and peas). Soluble fiber can reduce cholesterol (which may help lower the risk of heart disease), and helps control blood sugar levels.  Look for High-Fiber Foods  Whole-grain breads and cereals: Try to eat 6-8 ounces a day. Include wheat and oat bran cereals, whole-wheat muffins or toast, and corn tortillas in your meals.  Fruits: Try to eat 2 cups a day. Apples, oranges, strawberries, pears, and bananas are good sources. (Note: Fruit juice is low in fiber.)  Vegetables: Try to eat 3 cups a day. Add asparagus, carrots,  broccoli, peas, and corn to your meals.  Legumes (beans): One cup of cooked lentils gives you over 15 grams of fiber. Try navy beans, lentils, and chickpeas.  Seeds:  A small handful of seeds gives you about 3 grams of fiber. Try sunflower seeds.    Keep Track of Your Fiber  A healthy diet includes 31 grams of fiber a day if you have a 2,000-calorie diet. Keep track of how much fiber you eat. Start by reading food labels. Then eat a variety of foods high in fiber. Ask your doctor about supplemental fiber products.            8720-1832 [facility], [address] [place] [address], [place] [address]. All rights reserved. This information is not intended as a substitute for professional medical care. Always follow your healthcare professional's instructions.    Understanding Diverticulosis and Diverticulitis     Pouches or diverticula usually occur in the lower part of the colon called the sigmoid.      Diverticulitis occurs when the pouches become inflamed.     The colon (large intestine) is the last part of the digestive tract. It absorbs water from stool and changes it from a liquid to a solid. In certain cases, small pouches called diverticula can form in the colon wall. This condition is called diverticulosis. The pouches can become infected. If this happens, it becomes a more serious problem called diverticulitis. These problems can be painful. But they can be managed.   Managing Your Condition  Diet changes or taking medications are often tried first. These may be enough to bring relief. If the case is bad, surgery may be done. You and your doctor can discuss the plan that is best for you.  If You Have Diverticulosis  Diet changes are often enough to control symptoms. The main changes are adding fiber (roughage) and drinking more water. Fiber absorbs water as it travels through your colon. This helps your stool stay soft and move smoothly. Water helps this process. If needed, you may be told to take over-the-counter  stool softeners. To help relieve pain, antispasmodic medications may be prescribed.  If You Have Diverticulitis  Treatment depends on how bad your symptoms are.  For mild symptoms: You may be put on a liquid diet for a short time. You may also be prescribed antibiotics. If these two steps relieve your symptoms, you may then be prescribed a high-fiber diet. If you still have symptoms, your doctor will discuss further treatment options with you.  For severe symptoms: You may need to be admitted to the hospital. There, you can be given IV antibiotics and fluids. Once symptoms are under control, the above treatments may be tried. If these don t control your condition, your doctor may discuss the option of having surgery with you.  [place] to Colon Health  Help keep your colon healthy with a diet that includes plenty of high-fiber fruits, vegetables, and whole grains. Drink plenty of liquids like water and juice. Your doctor may also recommend avoiding seeds and nuts.          [id number] [facility], [address] [place] [address], [place] [address]. All rights reserved. This information is not intended as a substitute for professional medical care. Always follow your healthcare professional's instructions.

## 2019-12-15 ENCOUNTER — HEALTH MAINTENANCE LETTER (OUTPATIENT)
Age: 49
End: 2019-12-15

## 2021-01-15 ENCOUNTER — HEALTH MAINTENANCE LETTER (OUTPATIENT)
Age: 51
End: 2021-01-15

## 2021-04-28 ENCOUNTER — IMMUNIZATION (OUTPATIENT)
Dept: NURSING | Facility: CLINIC | Age: 51
End: 2021-04-28
Payer: COMMERCIAL

## 2021-04-28 PROCEDURE — 91300 PR COVID VAC PFIZER DIL RECON 30 MCG/0.3 ML IM: CPT

## 2021-04-28 PROCEDURE — 0001A PR COVID VAC PFIZER DIL RECON 30 MCG/0.3 ML IM: CPT

## 2021-05-24 ENCOUNTER — IMMUNIZATION (OUTPATIENT)
Dept: NURSING | Facility: CLINIC | Age: 51
End: 2021-05-24
Attending: INTERNAL MEDICINE
Payer: COMMERCIAL

## 2021-05-24 PROCEDURE — 0002A PR COVID VAC PFIZER DIL RECON 30 MCG/0.3 ML IM: CPT

## 2021-05-24 PROCEDURE — 91300 PR COVID VAC PFIZER DIL RECON 30 MCG/0.3 ML IM: CPT

## 2021-09-04 ENCOUNTER — HEALTH MAINTENANCE LETTER (OUTPATIENT)
Age: 51
End: 2021-09-04

## 2022-02-19 ENCOUNTER — HEALTH MAINTENANCE LETTER (OUTPATIENT)
Age: 52
End: 2022-02-19

## 2022-10-22 ENCOUNTER — HEALTH MAINTENANCE LETTER (OUTPATIENT)
Age: 52
End: 2022-10-22

## 2023-01-09 ENCOUNTER — TELEPHONE (OUTPATIENT)
Dept: NURSING | Facility: CLINIC | Age: 53
End: 2023-01-09

## 2023-01-09 NOTE — TELEPHONE ENCOUNTER
IO COLLECTED. DAGMAR LEONARD PROVIDED PT REFUSED. Patient calling to report that he has tested positive for Covid since 12/29/2022.  Wondering about Paxlovid.    Would like to establish care with a PCP in the [facility] as he has a new insurance plan through work.    Coronavirus (COVID-19) Notification    Caller Name (Patient, parent, daughter/son, grandparent, etc)  Colten Lucas, patient    Gather patient reported symptoms   Assessment   Current Symptoms at time of phone call, reported by patient: (if no symptoms, document: No symptoms] Nasal congestion, achy, fatigue, cough   Date of symptom(s) onset (if applicable) 12/29/2022     If at time of call, Patients symptoms have worsened, the Patient should contact 911 or have someone drive them to Emergency Dept promptly:      If Patient calling 911, inform 911 personal that you have tested positive for the Coronavirus (COVID-19).  Place mask on and await 911 to arrive.    If Emergency Dept, If possible, please have another adult drive you to the Emergency Dept but you need to wear mask when in contact with other people.      Treatment Options:   Patient classified as COVID treatment eligible by Epic high risk algorithm: No  You may be eligible to receive a new treatment with a monoclonal antibody for preventing hospitalization in patients at high risk for complications from COVID-19.  This medication is still experimental and available on a limited basis; it is given through an IV and must be given at an infusion center.  Please note that not all people who are eligible will receive the medication since it is in limited supply.   Is the patient symptomatic and onset of symptoms within the last 7 days? No. Patient does not qualify.    Review information with Patient    Your result was positive. This means you have COVID-19 (coronavirus).    How can I protect others?    These guidelines are for isolating before returning to work, school or .    If you DO have symptoms    Stay home and away from  others     For at least 5 days after your symptoms started, AND    You are fever free for 24 hours (with no medicine that reduces fever), AND    Your other symptoms are better    Wear a mask for 10 full days anytime you are around others    If you DON'T have symptoms    Stay home and away from others for at least 5 days after your positive test    Wear a mask for 10 full days anytime you are around others    Would you like me to review some of that information with you now?  Yes    How can I take care of myself?      Get lots of rest. Drink extra fluids (unless a doctor has told you not to).      Take Tylenol (acetaminophen) for fever or pain. If you have liver or kidney problems, ask your family doctor if it's okay to take Tylenol.     Take either:     650 mg (two 325 mg pills) every 4 to 6 hours, or     1,000 mg (two 500 mg pills) every 8 hours as needed.     Note: Do not take more than 3,000 mg in one day. Acetaminophen is found in many medicines (both prescribed and over-the-counter medicines). Read all labels to be sure you don't take too much.      Carmen Voss, RN

## 2023-04-01 ENCOUNTER — HEALTH MAINTENANCE LETTER (OUTPATIENT)
Age: 53
End: 2023-04-01

## 2023-04-04 ASSESSMENT — ENCOUNTER SYMPTOMS
HEARTBURN: 0
WEAKNESS: 0
NERVOUS/ANXIOUS: 0
ARTHRALGIAS: 0
DIARRHEA: 0
NAUSEA: 0
ABDOMINAL PAIN: 0
HEMATURIA: 0
DIZZINESS: 0
CONSTIPATION: 0
JOINT SWELLING: 0
SORE THROAT: 0
FEVER: 0
CHILLS: 0
COUGH: 0
MYALGIAS: 0
FREQUENCY: 0
PALPITATIONS: 0
EYE PAIN: 0
HEMATOCHEZIA: 0
SHORTNESS OF BREATH: 0
HEADACHES: 0
PARESTHESIAS: 0
DYSURIA: 0

## 2023-04-05 ENCOUNTER — OFFICE VISIT (OUTPATIENT)
Dept: FAMILY MEDICINE | Facility: CLINIC | Age: 53
End: 2023-04-05
Payer: COMMERCIAL

## 2023-04-05 VITALS
HEIGHT: 69 IN | RESPIRATION RATE: 16 BRPM | HEART RATE: 65 BPM | DIASTOLIC BLOOD PRESSURE: 89 MMHG | WEIGHT: 169 LBS | BODY MASS INDEX: 25.03 KG/M2 | SYSTOLIC BLOOD PRESSURE: 152 MMHG | TEMPERATURE: 97 F | OXYGEN SATURATION: 100 %

## 2023-04-05 DIAGNOSIS — Z13.1 SCREENING FOR DIABETES MELLITUS: ICD-10-CM

## 2023-04-05 DIAGNOSIS — Z13.6 SCREENING FOR CARDIOVASCULAR CONDITION: ICD-10-CM

## 2023-04-05 DIAGNOSIS — Z12.5 SCREENING FOR PROSTATE CANCER: ICD-10-CM

## 2023-04-05 DIAGNOSIS — Z00.00 ROUTINE GENERAL MEDICAL EXAMINATION AT A HEALTH CARE FACILITY: Primary | ICD-10-CM

## 2023-04-05 DIAGNOSIS — R03.0 ELEVATED BP WITHOUT DIAGNOSIS OF HYPERTENSION: ICD-10-CM

## 2023-04-05 LAB
ERYTHROCYTE [DISTWIDTH] IN BLOOD BY AUTOMATED COUNT: 13 % (ref 10–15)
HBA1C MFR BLD: 5.1 % (ref 0–5.6)
HCT VFR BLD AUTO: 36.9 % (ref 40–53)
HGB BLD-MCNC: 13.2 G/DL (ref 13.3–17.7)
MCH RBC QN AUTO: 28.4 PG (ref 26.5–33)
MCHC RBC AUTO-ENTMCNC: 35.8 G/DL (ref 31.5–36.5)
MCV RBC AUTO: 79 FL (ref 78–100)
PLATELET # BLD AUTO: 202 10E3/UL (ref 150–450)
RBC # BLD AUTO: 4.65 10E6/UL (ref 4.4–5.9)
WBC # BLD AUTO: 4.7 10E3/UL (ref 4–11)

## 2023-04-05 PROCEDURE — 80061 LIPID PANEL: CPT | Performed by: PHYSICIAN ASSISTANT

## 2023-04-05 PROCEDURE — 36415 COLL VENOUS BLD VENIPUNCTURE: CPT | Performed by: PHYSICIAN ASSISTANT

## 2023-04-05 PROCEDURE — G0103 PSA SCREENING: HCPCS | Performed by: PHYSICIAN ASSISTANT

## 2023-04-05 PROCEDURE — 99386 PREV VISIT NEW AGE 40-64: CPT | Performed by: PHYSICIAN ASSISTANT

## 2023-04-05 PROCEDURE — 83036 HEMOGLOBIN GLYCOSYLATED A1C: CPT | Performed by: PHYSICIAN ASSISTANT

## 2023-04-05 PROCEDURE — 99213 OFFICE O/P EST LOW 20 MIN: CPT | Mod: 25 | Performed by: PHYSICIAN ASSISTANT

## 2023-04-05 PROCEDURE — 84443 ASSAY THYROID STIM HORMONE: CPT | Performed by: PHYSICIAN ASSISTANT

## 2023-04-05 PROCEDURE — 85027 COMPLETE CBC AUTOMATED: CPT | Performed by: PHYSICIAN ASSISTANT

## 2023-04-05 PROCEDURE — 80048 BASIC METABOLIC PNL TOTAL CA: CPT | Performed by: PHYSICIAN ASSISTANT

## 2023-04-05 ASSESSMENT — ENCOUNTER SYMPTOMS
DYSURIA: 0
HEMATURIA: 0
ABDOMINAL PAIN: 0
DIZZINESS: 0
NAUSEA: 0
WEAKNESS: 0
HEARTBURN: 0
HEADACHES: 0
SHORTNESS OF BREATH: 0
EYE PAIN: 0
MYALGIAS: 0
JOINT SWELLING: 0
COUGH: 0
ARTHRALGIAS: 0
DIARRHEA: 0
CHILLS: 0
FEVER: 0
FREQUENCY: 0
HEMATOCHEZIA: 0
CONSTIPATION: 0
PARESTHESIAS: 0
SORE THROAT: 0
PALPITATIONS: 0
NERVOUS/ANXIOUS: 0

## 2023-04-05 ASSESSMENT — PAIN SCALES - GENERAL: PAINLEVEL: NO PAIN (0)

## 2023-04-05 NOTE — PROGRESS NOTES
SUBJECTIVE:   CC: Colten is an 53 year old who presents for preventative health visit.       4/5/2023     3:54 PM   Additional Questions   Roomed by KM   Accompanied by NONE     Patient has been advised of split billing requirements and indicates understanding: Yes  Healthy Habits:     Getting at least 3 servings of Calcium per day:  NO    Bi-annual eye exam:  NO    Dental care twice a year:  Yes    Sleep apnea or symptoms of sleep apnea:  None    Diet:  Other    Frequency of exercise:  4-5 days/week    Duration of exercise:  45-60 minutes    Taking medications regularly:  Yes    Medication side effects:  Not applicable    PHQ-2 Total Score: 0    Additional concerns today:  No    - High BP reading at recent dental visits, thinks he may have family history of HTN.    Today's PHQ-2 Score:       4/4/2023     4:21 PM   PHQ-2 ( 1999 [employer])   Q1: Little interest or pleasure in doing things 0   Q2: Feeling down, depressed or hopeless 0   PHQ-2 Score 0   Q1: Little interest or pleasure in doing things Not at all   Q2: Feeling down, depressed or hopeless Not at all   PHQ-2 Score 0       Have you ever done Advance Care Planning? (For example, a Health Directive, POLST, or a discussion with a medical provider or your loved ones about your wishes): No, advance care planning information given to patient to review.  Patient declined advance care planning discussion at this time.    Social History     Tobacco Use     Smoking status: Never     Smokeless tobacco: Never   Vaping Use     Vaping status: Not on file   Substance Use Topics     Alcohol use: Yes     Comment: 2 glasses of wine per wk            4/4/2023     4:20 PM   Alcohol Use   Prescreen: >3 drinks/day or >7 drinks/week? No       Last PSA: No results found for: PSA    Reviewed orders with patient. Reviewed health maintenance and updated orders accordingly - Yes  BP Readings from Last 3 Encounters:   04/05/23 (!) 152/89   03/08/19 114/82   08/20/18 (!) 144/96    Wt  Readings from Last 3 Encounters:   04/05/23 76.7 kg (169 lb)   08/20/18 86.2 kg (190 lb)   06/28/17 86.4 kg (190 lb 8 oz)              Patient Active Problem List   Diagnosis     Tinnitus, bilateral     Past Surgical History:   Procedure Laterality Date     COLONOSCOPY N/A 3/8/2019    Procedure: COLONOSCOPY;  Surgeon: Yung Jones MD;  Location: [facility] NONSPECIFIC PROCEDURE  ~1973    tonsillectomy       Social History     Tobacco Use     Smoking status: Never     Smokeless tobacco: Never   Vaping Use     Vaping status: Not on file   Substance Use Topics     Alcohol use: Yes     Comment: 2 glasses of wine per wk      Family History   Problem Relation Age of Onset     Diabetes Paternal Grandmother      Colon Cancer No family hx of            Reviewed and updated as needed this visit by clinical staff   Tobacco  Allergies  Meds  Problems  Med Hx  Surg Hx  Fam Hx          Reviewed and updated as needed this visit by Provider   Tobacco  Allergies  Meds  Problems  Med Hx  Surg Hx  Fam Hx             Review of Systems   Constitutional: Negative for chills and fever.   HENT: Negative for congestion, ear pain, hearing loss and sore throat.    Eyes: Negative for pain and visual disturbance.   Respiratory: Negative for cough and shortness of breath.    Cardiovascular: Negative for chest pain, palpitations and peripheral edema.   Gastrointestinal: Negative for abdominal pain, constipation, diarrhea, heartburn, hematochezia and nausea.   Genitourinary: Negative for dysuria, frequency, genital sores, hematuria, impotence, penile discharge and urgency.   Musculoskeletal: Negative for arthralgias, joint swelling and myalgias.   Skin: Negative for rash.   Neurological: Negative for dizziness, weakness, headaches and paresthesias.   Psychiatric/Behavioral: Negative for mood changes. The patient is not nervous/anxious.        OBJECTIVE:   BP (!) 152/89   Pulse 65   Temp 97  F (36.1  C) (Tympanic)   Resp  "16   Ht 1.753 m (5' 9\")   Wt 76.7 kg (169 lb)   SpO2 100%   BMI 24.96 kg/m      Physical Exam  GENERAL: healthy, alert and no distress  EYES: Eyes grossly normal to inspection, PERRL and conjunctivae and sclerae normal  HENT: ear canals and TM's normal, nose and mouth without ulcers or lesions  NECK: no adenopathy, no asymmetry, masses, or scars and thyroid normal to palpation  RESP: lungs clear to auscultation - no rales, rhonchi or wheezes  CV: regular rate and rhythm, normal S1 S2, no S3 or S4, no murmur, click or rub, no peripheral edema and peripheral pulses strong  ABDOMEN: soft, nontender, no hepatosplenomegaly, no masses and bowel sounds normal  MS: no gross musculoskeletal defects noted, no edema  SKIN: no suspicious lesions or rashes  NEURO: Normal strength and tone, mentation intact and speech normal  PSYCH: mentation appears normal, affect normal/bright        ASSESSMENT/PLAN:       ICD-10-CM    1. Routine general medical examination at a health care facility  Z00.00 CBC with platelets     CBC with platelets      2. Elevated BP without diagnosis of hypertension  R03.0 Basic metabolic panel  (Ca, Cl, CO2, Creat, Gluc, K, Na, BUN)     PRIMARY CARE FOLLOW-UP SCHEDULING     Basic metabolic panel  (Ca, Cl, CO2, Creat, Gluc, K, Na, BUN)     TSH with free T4 reflex      3. Screening for cardiovascular condition  Z13.6 Lipid panel reflex to direct LDL Non-fasting     Lipid panel reflex to direct LDL Non-fasting      4. Screening for diabetes mellitus  Z13.1 Hemoglobin A1c     Hemoglobin A1c      5. Screening for prostate cancer  Z12.5 PSA, screen     PSA, screen        - BP remains elevated, likely dx HTN. Patient would like to try lifestyle changes; discussed recommendations. Recheck in 6 weeks, if remains elevated, recommend medication therapy at that time.    COUNSELING:   Reviewed preventive health counseling, as reflected in patient instructions       Immunizations    Declined: TDAP and Zoster due to " Other (defers to later appointment)               Prostate cancer screening    He reports that he has never smoked. He has never used smokeless tobacco.        Liat Montoya PA-C  [facility]

## 2023-04-06 LAB
ANION GAP SERPL CALCULATED.3IONS-SCNC: 12 MMOL/L (ref 7–15)
BUN SERPL-MCNC: 17 MG/DL (ref 6–20)
CALCIUM SERPL-MCNC: 9.6 MG/DL (ref 8.6–10)
CHLORIDE SERPL-SCNC: 106 MMOL/L (ref 98–107)
CHOLEST SERPL-MCNC: 214 MG/DL
CREAT SERPL-MCNC: 0.98 MG/DL (ref 0.67–1.17)
DEPRECATED HCO3 PLAS-SCNC: 24 MMOL/L (ref 22–29)
GFR SERPL CREATININE-BSD FRML MDRD: >90 ML/MIN/1.73M2
GLUCOSE SERPL-MCNC: 91 MG/DL (ref 70–99)
HDLC SERPL-MCNC: 40 MG/DL
LDLC SERPL CALC-MCNC: 153 MG/DL
NONHDLC SERPL-MCNC: 174 MG/DL
POTASSIUM SERPL-SCNC: 4.1 MMOL/L (ref 3.4–5.3)
PSA SERPL DL<=0.01 NG/ML-MCNC: 1.06 NG/ML (ref 0–3.5)
SODIUM SERPL-SCNC: 142 MMOL/L (ref 136–145)
TRIGL SERPL-MCNC: 106 MG/DL
TSH SERPL DL<=0.005 MIU/L-ACNC: 1.34 UIU/ML (ref 0.3–4.2)

## 2023-05-17 ENCOUNTER — ALLIED HEALTH/NURSE VISIT (OUTPATIENT)
Dept: NURSING | Facility: CLINIC | Age: 53
End: 2023-05-17
Attending: PHYSICIAN ASSISTANT
Payer: COMMERCIAL

## 2023-05-17 ENCOUNTER — TELEPHONE (OUTPATIENT)
Dept: FAMILY MEDICINE | Facility: CLINIC | Age: 53
End: 2023-05-17

## 2023-05-17 VITALS — DIASTOLIC BLOOD PRESSURE: 90 MMHG | SYSTOLIC BLOOD PRESSURE: 142 MMHG

## 2023-05-17 DIAGNOSIS — R03.0 ELEVATED BP WITHOUT DIAGNOSIS OF HYPERTENSION: ICD-10-CM

## 2023-05-17 PROCEDURE — 99207 PR NO CHARGE NURSE ONLY: CPT

## 2023-05-17 NOTE — TELEPHONE ENCOUNTER
Detailed VM left for pt with provider recommendations. Given clinic number for further questions.    Kaylen LIU RN  [facility] Triage Team

## 2023-05-17 NOTE — TELEPHONE ENCOUNTER
Colten Lucas is a 53 year old year old patient who comes in today for a Blood Pressure check because of    Scheduled for nurse visit for ongoing blood pressure monitoring d/t elevated reading at last OV    Vital Signs as repeated by RN     /88  Repeat /90    PT denies any CP, dizziness, SOB, h/a, blurred vision    Patient is not currently taking any medications      Patient is monitoring Blood Pressure at home.  No    Current complaints: none    Disposition:  Routed to PCP for review    Per pt. He has been working on diet,but not as much as he would have liked. He  would like to hold off on BP meds  And recheck BP in a few weeks to see if continued diet and exercise will lower BP more- with provider approval. Please advise    Ok to leave detailed VM on callback.    Kaylen LIU RN  [facility] Triage Team

## 2023-05-17 NOTE — TELEPHONE ENCOUNTER
Readings have improved slightly since our visit but remain elevated. As long as patient feels he can stick with diet and exercise, ok to continue working on this for another month and return for recheck at that time (RN only BP check). If not, I recommend we start a medication and recheck in 2 weeks.

## 2023-12-14 ENCOUNTER — MYC MEDICAL ADVICE (OUTPATIENT)
Dept: FAMILY MEDICINE | Facility: CLINIC | Age: 53
End: 2023-12-14
Payer: COMMERCIAL

## 2023-12-14 NOTE — TELEPHONE ENCOUNTER
Please see Diptit as FYI. Based on blood pressure can determine urgency of appointment. If you would prefer to have patient schedule with you first, please advise. Amanda Dnaiel RN

## 2023-12-15 ENCOUNTER — ALLIED HEALTH/NURSE VISIT (OUTPATIENT)
Dept: FAMILY MEDICINE | Facility: CLINIC | Age: 53
End: 2023-12-15
Payer: COMMERCIAL

## 2023-12-15 VITALS
SYSTOLIC BLOOD PRESSURE: 150 MMHG | DIASTOLIC BLOOD PRESSURE: 100 MMHG | BODY MASS INDEX: 26.02 KG/M2 | HEART RATE: 62 BPM | OXYGEN SATURATION: 99 % | WEIGHT: 176.2 LBS

## 2023-12-15 DIAGNOSIS — Z01.30 BLOOD PRESSURE CHECK: Primary | ICD-10-CM

## 2023-12-15 DIAGNOSIS — I10 BENIGN ESSENTIAL HYPERTENSION: ICD-10-CM

## 2023-12-15 PROCEDURE — 99207 PR NO CHARGE NURSE ONLY: CPT

## 2023-12-15 RX ORDER — ENALAPRIL MALEATE 5 MG/1
5 TABLET ORAL DAILY
Qty: 30 TABLET | Refills: 0 | Status: SHIPPED | OUTPATIENT
Start: 2023-12-15 | End: 2024-01-15

## 2023-12-15 RX ORDER — ENALAPRIL MALEATE 5 MG/1
5 TABLET ORAL DAILY
Qty: 90 TABLET | OUTPATIENT
Start: 2023-12-15

## 2023-12-15 NOTE — PROGRESS NOTES
Call attempt #1.       Left message on secure voicemail.     Patient told medication was called to his pharmacy, we do need him to call back so we can schedule a follow up  appointment.       Amanda Brand RN  [facility]

## 2023-12-15 NOTE — PROGRESS NOTES
Recommend BP medication given persistently elevated BP. Sent enalapril to patient's pharmacy. Take 1 tablet daily, around the same time each day - doesn't need to be taken with food. Schedule recheck with me in 1 month for BP recheck and labs (ok to use same day if needed) - please call patient with updated plan.

## 2023-12-15 NOTE — PROGRESS NOTES
Colten Lucas is a 53 year old patient who comes in today for a Blood Pressure check.  Initial BP:  BP (!) 148/100 (BP Location: Left arm, Patient Position: Sitting, Cuff Size: Adult Large)   Pulse 62   Wt 79.9 kg (176 lb 3.2 oz)   SpO2 99%   BMI 26.02 kg/m       62    1st BP  148/100  2nd /100  3rd BP  150/100    Disposition: provider notified while patient in the clinic  Latoya Yates CMA      12/15/2023     3:35 PM

## 2023-12-15 NOTE — Clinical Note
Colten Lucas is a 53 year old patient who comes in today for a Blood Pressure check. Initial BP:  BP (!) 148/100 (BP Location: Left arm, Patient Position: Sitting, Cuff Size: Adult Large)   Pulse 62   Wt 79.9 kg (176 lb 3.2 oz)   SpO2 99%   BMI 26.02 kg/m      62  1st BP  148/100 2nd /100 3rd BP  150/100  Disposition: provider notified while patient in the clinic

## 2023-12-21 NOTE — PROGRESS NOTES
Per chart review, pt does have appt scheduled.     Jan 17, 2024  4:00 PM  (Arrive by 3:55 PM)  Provider Visit with Liat Montoya PA-C  [facility] ([facility] [place] ) [phone]     Closing encounter.

## 2024-01-15 ENCOUNTER — MYC MEDICAL ADVICE (OUTPATIENT)
Dept: FAMILY MEDICINE | Facility: CLINIC | Age: 54
End: 2024-01-15
Payer: COMMERCIAL

## 2024-01-15 DIAGNOSIS — I10 BENIGN ESSENTIAL HYPERTENSION: ICD-10-CM

## 2024-01-15 RX ORDER — ENALAPRIL MALEATE 5 MG/1
5 TABLET ORAL DAILY
Qty: 30 TABLET | Refills: 0 | Status: SHIPPED | OUTPATIENT
Start: 2024-01-15 | End: 2024-01-17

## 2024-01-15 NOTE — TELEPHONE ENCOUNTER
Appointments in Next Year      Jan 17, 2024  4:00 PM  (Arrive by 3:55 PM)  Provider Visit with Liat Montoya PA-C  [facility] ([facility] [place] ) [phone]          See below pt will be 1 pill short for upcoming visit     Requested Prescriptions   Pending Prescriptions Disp Refills    enalapril (VASOTEC) 5 MG tablet 30 tablet 0     Sig: Take 1 tablet (5 mg) by mouth daily       ACE Inhibitors (Including Combos) Protocol Failed - 1/15/2024 11:18 AM        Failed - Blood pressure under 140/90 in past 12 months     BP Readings from Last 3 Encounters:   12/15/23 (!) 150/100   05/17/23 (!) 142/90   04/05/23 (!) 152/89                 Passed - Recent (12 mo) or future (30 days) visit within the authorizing provider's specialty     The patient must have completed an in-person or virtual visit within the past 12 months or has a future visit scheduled within the next 90 days with the authorizing provider s specialty.  Urgent care and e-visits do not quality as an office visit for this protocol.          Passed - Medication is active on med list        Passed - Patient is age 18 or older        Passed - Normal serum creatinine on file in past 12 months     Recent Labs   Lab Test 04/05/23  1636   CR 0.98       Ok to refill medication if creatinine is low          Passed - Normal serum potassium on file in past 12 months     Recent Labs   Lab Test 04/05/23  1636   POTASSIUM 4.1

## 2024-01-17 ENCOUNTER — OFFICE VISIT (OUTPATIENT)
Dept: FAMILY MEDICINE | Facility: CLINIC | Age: 54
End: 2024-01-17
Payer: COMMERCIAL

## 2024-01-17 VITALS
WEIGHT: 176.6 LBS | DIASTOLIC BLOOD PRESSURE: 82 MMHG | TEMPERATURE: 96.9 F | HEART RATE: 65 BPM | RESPIRATION RATE: 16 BRPM | BODY MASS INDEX: 25.28 KG/M2 | SYSTOLIC BLOOD PRESSURE: 150 MMHG | HEIGHT: 70 IN | OXYGEN SATURATION: 100 %

## 2024-01-17 DIAGNOSIS — I10 BENIGN ESSENTIAL HYPERTENSION: Primary | ICD-10-CM

## 2024-01-17 PROCEDURE — 36415 COLL VENOUS BLD VENIPUNCTURE: CPT | Performed by: PHYSICIAN ASSISTANT

## 2024-01-17 PROCEDURE — 80048 BASIC METABOLIC PNL TOTAL CA: CPT | Performed by: PHYSICIAN ASSISTANT

## 2024-01-17 PROCEDURE — 99213 OFFICE O/P EST LOW 20 MIN: CPT | Performed by: PHYSICIAN ASSISTANT

## 2024-01-17 RX ORDER — ENALAPRIL MALEATE 10 MG/1
10 TABLET ORAL DAILY
Qty: 90 TABLET | Refills: 0 | Status: SHIPPED | OUTPATIENT
Start: 2024-01-17 | End: 2024-04-15

## 2024-01-17 ASSESSMENT — PAIN SCALES - GENERAL: PAINLEVEL: NO PAIN (0)

## 2024-01-17 NOTE — PROGRESS NOTES
"  Assessment & Plan       ICD-10-CM    1. Benign essential hypertension  I10 enalapril (VASOTEC) 10 MG tablet     Basic metabolic panel     Basic metabolic panel        DBP at goal, but SBP remains elevated. Will increase enalapril to 10 mg every day and recheck in 2 weeks. Check BMP for completeness today.    Jalyn Abel is a 53 year old, presenting for the following health issues:  Medication Follow-up (Blood pressure meds)        1/17/2024     3:50 PM   Additional Questions   Roomed by vin     History of Present Illness       Hypertension: He presents for follow up of hypertension.  He does not check blood pressure  regularly outside of the clinic. Outside blood pressures have been over 140/90.      He eats 0-1 servings of fruits and vegetables daily.He consumes 1 sweetened beverage(s) daily.He exercises with enough effort to increase his heart rate 60 or more minutes per day.  He exercises with enough effort to increase his heart rate 5 days per week.   He is taking medications regularly.    Started on enalapril 5 mg every day about 1 month ago. Here for recheck. Denies issues with missed or skipped doses. No issues with side effects.      Review of Systems  Constitutional, neuro, ENT, endocrine, pulmonary, cardiac, gastrointestinal, genitourinary, musculoskeletal, integument and psychiatric systems are negative, except as otherwise noted.      Objective    BP (!) 150/82   Pulse 65   Temp 96.9  F (36.1  C) (Temporal)   Resp 16   Ht 1.765 m (5' 9.5\")   Wt 80.1 kg (176 lb 9.6 oz)   SpO2 100%   BMI 25.71 kg/m    Body mass index is 25.71 kg/m .    Physical Exam   GENERAL:  WDWN, no acute distress  PSYCH: pleasant, cooperative  EYES: no discharge, no injection  HENT:  Normocephalic. Moist mucus membranes.  NECK:  Supple, symmetric  HEART:  Regular rate & rhythm. No murmur, gallop, or rub.  EXTREMITIES:  No gross deformities, moves all 4 limbs spontaneously, no peripheral edema  SKIN:  Warm and " dry, no rash or suspicious lesions    NEUROLOGIC:  alert, sensation grossly intact.            Signed Electronically by: Liat Montoya PA-C

## 2024-01-19 LAB
ANION GAP SERPL CALCULATED.3IONS-SCNC: 7 MMOL/L (ref 7–15)
BUN SERPL-MCNC: 17.8 MG/DL (ref 6–20)
CALCIUM SERPL-MCNC: 9.5 MG/DL (ref 8.6–10)
CHLORIDE SERPL-SCNC: 107 MMOL/L (ref 98–107)
CREAT SERPL-MCNC: 0.97 MG/DL (ref 0.67–1.17)
DEPRECATED HCO3 PLAS-SCNC: 28 MMOL/L (ref 22–29)
EGFRCR SERPLBLD CKD-EPI 2021: >90 ML/MIN/1.73M2
GLUCOSE SERPL-MCNC: 92 MG/DL (ref 70–99)
POTASSIUM SERPL-SCNC: 4.6 MMOL/L (ref 3.4–5.3)
SODIUM SERPL-SCNC: 142 MMOL/L (ref 135–145)

## 2024-02-07 ENCOUNTER — ALLIED HEALTH/NURSE VISIT (OUTPATIENT)
Dept: FAMILY MEDICINE | Facility: CLINIC | Age: 54
End: 2024-02-07
Payer: COMMERCIAL

## 2024-02-07 VITALS
WEIGHT: 174.2 LBS | OXYGEN SATURATION: 100 % | SYSTOLIC BLOOD PRESSURE: 150 MMHG | HEART RATE: 66 BPM | DIASTOLIC BLOOD PRESSURE: 98 MMHG | BODY MASS INDEX: 25.36 KG/M2

## 2024-02-07 DIAGNOSIS — Z01.30 BLOOD PRESSURE CHECK: Primary | ICD-10-CM

## 2024-02-07 PROCEDURE — 99207 PR NO CHARGE NURSE ONLY: CPT

## 2024-02-07 NOTE — Clinical Note
Colten Lucas is a 54 year old patient who comes in today for a Blood Pressure check. Initial BP:  BP (!) 150/98   Pulse 66   Wt 79 kg (174 lb 3.2 oz)   SpO2 100%   BMI 25.36 kg/m      66  Disposition: results routed to provider and told patient he will receive a message for follow up.

## 2024-02-07 NOTE — Clinical Note
SHAQ.   Spoke to pt, pt would like to hold of on increasing Enalapril for now. States he has had some stressfull things at home the past few weeks and has not been sleeping or eating like normal.     Appointment for MA bp check scheduled for 2/21.   Keagan HAQUE CMA

## 2024-03-06 ENCOUNTER — PATIENT OUTREACH (OUTPATIENT)
Dept: CARE COORDINATION | Facility: CLINIC | Age: 54
End: 2024-03-06
Payer: COMMERCIAL

## 2024-04-14 DIAGNOSIS — I10 BENIGN ESSENTIAL HYPERTENSION: ICD-10-CM

## 2024-04-15 RX ORDER — ENALAPRIL MALEATE 10 MG/1
10 TABLET ORAL DAILY
Qty: 90 TABLET | Refills: 0 | Status: SHIPPED | OUTPATIENT
Start: 2024-04-15 | End: 2024-06-13

## 2024-06-01 ENCOUNTER — HEALTH MAINTENANCE LETTER (OUTPATIENT)
Age: 54
End: 2024-06-01

## 2024-06-12 ENCOUNTER — ALLIED HEALTH/NURSE VISIT (OUTPATIENT)
Dept: NURSING | Facility: CLINIC | Age: 54
End: 2024-06-12
Payer: COMMERCIAL

## 2024-06-12 ENCOUNTER — ALLIED HEALTH/NURSE VISIT (OUTPATIENT)
Dept: FAMILY MEDICINE | Facility: CLINIC | Age: 54
End: 2024-06-12
Payer: COMMERCIAL

## 2024-06-12 VITALS
DIASTOLIC BLOOD PRESSURE: 104 MMHG | RESPIRATION RATE: 16 BRPM | OXYGEN SATURATION: 100 % | SYSTOLIC BLOOD PRESSURE: 154 MMHG | HEART RATE: 63 BPM

## 2024-06-12 VITALS — DIASTOLIC BLOOD PRESSURE: 90 MMHG | SYSTOLIC BLOOD PRESSURE: 130 MMHG

## 2024-06-12 DIAGNOSIS — I10 BENIGN ESSENTIAL HYPERTENSION: ICD-10-CM

## 2024-06-12 DIAGNOSIS — Z01.30 BLOOD PRESSURE CHECK: Primary | ICD-10-CM

## 2024-06-12 DIAGNOSIS — I10 HTN (HYPERTENSION): Primary | ICD-10-CM

## 2024-06-12 PROCEDURE — 99207 PR NO CHARGE NURSE ONLY: CPT

## 2024-06-12 NOTE — Clinical Note
Colten Lucas is a 54 year old patient who comes in today for a Blood Pressure check. Initial BP:  BP (!) 154/104   Pulse 63   Resp 16   SpO2 100%     Data Unavailable Disposition: BP elevated.  Triage RN notified, patient asked to wait   Triage Nurse has been notified and is re-checking pt BP.

## 2024-06-12 NOTE — PROGRESS NOTES
Colten Lucas is a 54 year old year old patient who comes in today for a Blood Pressure check because of ongoing blood pressure monitoring.  Vital Signs as repeated by LOBITO Brand RN  -[facility]    Patient is taking medication as prescribed  Patient is tolerating medications well.  Patient is not monitoring Blood Pressure at home.  Current complaints: none  Disposition:      Liat,  sana checked blood pressure with manual cuff.     Blood pressure was 130/90.   No symptoms.     Please advise.       Amanda Brand RN  -[facility]

## 2024-06-12 NOTE — PROGRESS NOTES
Manual blood pressure was taken. B/P was 130/90, no symptoms.     Patient is taking blood pressure medication Vasotec 10 mg.       Amanda Brand RN  -[facility]

## 2024-06-13 RX ORDER — ENALAPRIL MALEATE 10 MG/1
10 TABLET ORAL DAILY
Qty: 90 TABLET | Refills: 1 | Status: SHIPPED | OUTPATIENT
Start: 2024-06-13

## 2024-08-15 NOTE — TELEPHONE ENCOUNTER
Forms completed - remind pt he is to see me for 2 week follow-up as previously discussed in clinic. Form in TC-out box. Please make copy and send to scanning as well.  Electronically Signed By: Dalia Murcia PA-C     normal...

## 2024-08-26 ENCOUNTER — TELEPHONE (OUTPATIENT)
Dept: FAMILY MEDICINE | Facility: CLINIC | Age: 54
End: 2024-08-26
Payer: COMMERCIAL

## 2024-08-26 NOTE — TELEPHONE ENCOUNTER
Patient Quality Outreach    Patient is due for the following:   Physical Preventive Adult Physical    Next Steps:   Schedule a Adult Preventative    Type of outreach:    Sent [employer] message.      Questions for provider review:    None           Tracie Moore

## 2024-09-03 NOTE — TELEPHONE ENCOUNTER
Patient Quality Outreach    Patient is due for the following:   Physical Preventive Adult Physical    Next Steps:   Schedule a Adult Preventative    Type of outreach:    Phone, left message for patient/parent to call back.    Next Steps:  Reach out within 90 days via Phone and [employer].    Max number of attempts reached: Yes. Will try again in 90 days if patient still on fail list.    Questions for provider review:    None           Tracie Moore

## 2024-10-02 ENCOUNTER — PATIENT OUTREACH (OUTPATIENT)
Dept: CARE COORDINATION | Facility: CLINIC | Age: 54
End: 2024-10-02
Payer: COMMERCIAL

## 2025-01-07 ENCOUNTER — NURSE TRIAGE (OUTPATIENT)
Dept: FAMILY MEDICINE | Facility: CLINIC | Age: 55
End: 2025-01-07
Payer: COMMERCIAL

## 2025-01-07 NOTE — TELEPHONE ENCOUNTER
Patient calling clinic to discuss MyChart symptoms.     Patient is currently asymptomatic, however has noticed some brief intermittent mild dizziness when turning his head quickly that resolves once his eyes adjust today.     Woke up last couple mornings with room spinning, dizziness, nauseated, ringing ears, prickly/clammy skin. This has been intermittent for the past few months. Symptoms go away as patient moves around more.     Patient has also had rectal bleeding with most bowel movements for past several months, around summer time.   This morning patient had light-red toilet water.    Patient has noticed toilet bowl water turns bright red, sometimes pink. He does not have a hx of hemorrhoids and does not have constipation.  Notices he needs to strain sometimes to pass BM completely.  Typically has 1-2 BMs daily that seem to be more irregular as time goes on. Used to be morning routine, now tends to vary. Patient thought it was his body getting older.     Denies rectal pain, blood clots, black/tarry stools or coffe ground consistency.    Dispo: See in Office Today  No in-person visits available today at preferred clinic. Patient does not want to be seen in Urgent Care.    - Patient requesting to see Dr. Avila tomorrow in same-day slot?  - If unable to be seen in office tomorrow, patient agrees to be seen via VV today if appts available.     Can we leave a detailed message on this number? YES  Phone number patient can be reached at: Cell number on file:    Telephone Information:   Mobile [phone]       Roselyn Avalos RN  [facility] Triage      Reason for Disposition   Blood in or on bowel movement is main symptom   MODERATE rectal bleeding (small blood clots, passing blood without stool, or toilet water turns red)    Additional Information   Negative: Passed out (e.g., fainted, lost consciousness, blacked out and was not responding)   Negative: Shock suspected (e.g., cold/pale/clammy skin,  too weak to stand, low BP, rapid pulse)   Negative: Vomiting red blood or black (coffee ground) material   Negative: Sounds like a life-threatening emergency to the triager   Negative: Diarrhea is main symptom   Negative: Rectal symptoms   Negative: SEVERE rectal bleeding (large blood clots; constant or on and off bleeding)   Negative: SEVERE dizziness (e.g., unable to stand, requires support to walk, feels like passing out now)   Negative: MODERATE rectal bleeding (small blood clots, passing blood without stool, or toilet water turns red) more than once a day   Negative: Bloody, black, or tarry bowel movements  (Exception: Chronic-unchanged black-grey bowel movements and is taking iron pills or Pepto-Bismol.)   Negative: High-risk adult (e.g., prior surgery on aorta, abdominal aortic aneurysm)   Negative: Rectal foreign body (inserted or swallowed)   Negative: SEVERE abdominal pain (e.g., excruciating)   Negative: Constant abdominal pain lasting > 2 hours   Negative: Pale skin (pallor) of new-onset or getting worse   Negative: Patient sounds very sick or weak to the triager    Protocols used: Rectal Symptoms-A-OH, Rectal Bleeding-A-OH

## 2025-01-07 NOTE — TELEPHONE ENCOUNTER
Called patient, left voicemail, did schedule the appointment, asked patient to call back to confirm appointment

## 2025-01-08 ENCOUNTER — OFFICE VISIT (OUTPATIENT)
Dept: FAMILY MEDICINE | Facility: CLINIC | Age: 55
End: 2025-01-08
Payer: COMMERCIAL

## 2025-01-08 VITALS
DIASTOLIC BLOOD PRESSURE: 90 MMHG | TEMPERATURE: 97.1 F | HEART RATE: 59 BPM | OXYGEN SATURATION: 99 % | SYSTOLIC BLOOD PRESSURE: 138 MMHG | RESPIRATION RATE: 18 BRPM | BODY MASS INDEX: 26.52 KG/M2 | WEIGHT: 182.2 LBS

## 2025-01-08 DIAGNOSIS — K92.1 BLOOD IN STOOL: ICD-10-CM

## 2025-01-08 DIAGNOSIS — Z12.5 SCREENING FOR PROSTATE CANCER: ICD-10-CM

## 2025-01-08 DIAGNOSIS — Z13.220 LIPID SCREENING: ICD-10-CM

## 2025-01-08 DIAGNOSIS — R42 DIZZINESS: ICD-10-CM

## 2025-01-08 DIAGNOSIS — I10 BENIGN ESSENTIAL HYPERTENSION: ICD-10-CM

## 2025-01-08 DIAGNOSIS — I10 BENIGN ESSENTIAL HYPERTENSION: Primary | ICD-10-CM

## 2025-01-08 LAB
ALBUMIN SERPL BCG-MCNC: 4.6 G/DL (ref 3.5–5.2)
ALP SERPL-CCNC: 74 U/L (ref 40–150)
ALT SERPL W P-5'-P-CCNC: 16 U/L (ref 0–70)
ANION GAP SERPL CALCULATED.3IONS-SCNC: 10 MMOL/L (ref 7–15)
AST SERPL W P-5'-P-CCNC: 24 U/L (ref 0–45)
BILIRUB SERPL-MCNC: 0.8 MG/DL
BUN SERPL-MCNC: 18.4 MG/DL (ref 6–20)
CALCIUM SERPL-MCNC: 9.9 MG/DL (ref 8.8–10.4)
CHLORIDE SERPL-SCNC: 104 MMOL/L (ref 98–107)
CHOLEST SERPL-MCNC: 227 MG/DL
CREAT SERPL-MCNC: 0.96 MG/DL (ref 0.67–1.17)
EGFRCR SERPLBLD CKD-EPI 2021: >90 ML/MIN/1.73M2
ERYTHROCYTE [DISTWIDTH] IN BLOOD BY AUTOMATED COUNT: 12.9 % (ref 10–15)
FASTING STATUS PATIENT QL REPORTED: NO
FASTING STATUS PATIENT QL REPORTED: NO
GLUCOSE SERPL-MCNC: 87 MG/DL (ref 70–99)
HCO3 SERPL-SCNC: 26 MMOL/L (ref 22–29)
HCT VFR BLD AUTO: 37.8 % (ref 40–53)
HDLC SERPL-MCNC: 33 MG/DL
HGB BLD-MCNC: 13.3 G/DL (ref 13.3–17.7)
LDLC SERPL CALC-MCNC: 147 MG/DL
MCH RBC QN AUTO: 28 PG (ref 26.5–33)
MCHC RBC AUTO-ENTMCNC: 35.2 G/DL (ref 31.5–36.5)
MCV RBC AUTO: 80 FL (ref 78–100)
NONHDLC SERPL-MCNC: 194 MG/DL
PLATELET # BLD AUTO: 222 10E3/UL (ref 150–450)
POTASSIUM SERPL-SCNC: 4.3 MMOL/L (ref 3.4–5.3)
PROT SERPL-MCNC: 7.2 G/DL (ref 6.4–8.3)
PSA SERPL DL<=0.01 NG/ML-MCNC: 1.2 NG/ML (ref 0–3.5)
RBC # BLD AUTO: 4.75 10E6/UL (ref 4.4–5.9)
SODIUM SERPL-SCNC: 140 MMOL/L (ref 135–145)
TRIGL SERPL-MCNC: 235 MG/DL
TSH SERPL DL<=0.005 MIU/L-ACNC: 1.38 UIU/ML (ref 0.3–4.2)
WBC # BLD AUTO: 7.8 10E3/UL (ref 4–11)

## 2025-01-08 PROCEDURE — 84443 ASSAY THYROID STIM HORMONE: CPT | Performed by: FAMILY MEDICINE

## 2025-01-08 PROCEDURE — G2211 COMPLEX E/M VISIT ADD ON: HCPCS | Performed by: FAMILY MEDICINE

## 2025-01-08 PROCEDURE — 80061 LIPID PANEL: CPT | Performed by: FAMILY MEDICINE

## 2025-01-08 PROCEDURE — G0103 PSA SCREENING: HCPCS | Performed by: FAMILY MEDICINE

## 2025-01-08 PROCEDURE — 90750 HZV VACC RECOMBINANT IM: CPT | Performed by: FAMILY MEDICINE

## 2025-01-08 PROCEDURE — 90472 IMMUNIZATION ADMIN EACH ADD: CPT | Performed by: FAMILY MEDICINE

## 2025-01-08 PROCEDURE — 85027 COMPLETE CBC AUTOMATED: CPT | Performed by: FAMILY MEDICINE

## 2025-01-08 PROCEDURE — 90471 IMMUNIZATION ADMIN: CPT | Performed by: FAMILY MEDICINE

## 2025-01-08 PROCEDURE — 80053 COMPREHEN METABOLIC PANEL: CPT | Performed by: FAMILY MEDICINE

## 2025-01-08 PROCEDURE — 99214 OFFICE O/P EST MOD 30 MIN: CPT | Mod: 25 | Performed by: FAMILY MEDICINE

## 2025-01-08 PROCEDURE — 36415 COLL VENOUS BLD VENIPUNCTURE: CPT | Performed by: FAMILY MEDICINE

## 2025-01-08 PROCEDURE — 90715 TDAP VACCINE 7 YRS/> IM: CPT | Performed by: FAMILY MEDICINE

## 2025-01-08 RX ORDER — ENALAPRIL MALEATE 10 MG/1
10 TABLET ORAL DAILY
Qty: 90 TABLET | Refills: 3 | Status: SHIPPED | OUTPATIENT
Start: 2025-01-08

## 2025-01-08 RX ORDER — MECLIZINE HYDROCHLORIDE 25 MG/1
25 TABLET ORAL 3 TIMES DAILY PRN
Qty: 30 TABLET | Refills: 0 | Status: SHIPPED | OUTPATIENT
Start: 2025-01-08

## 2025-01-08 RX ORDER — ENALAPRIL MALEATE 10 MG/1
10 TABLET ORAL DAILY
Qty: 90 TABLET | Refills: 1 | OUTPATIENT
Start: 2025-01-08

## 2025-01-08 ASSESSMENT — PAIN SCALES - GENERAL: PAINLEVEL_OUTOF10: NO PAIN (0)

## 2025-01-08 ASSESSMENT — ENCOUNTER SYMPTOMS: DIZZINESS: 1

## 2025-01-08 NOTE — PROGRESS NOTES
"  Assessment & Plan     Benign essential hypertension  Blood pressure is somewhat on the upper normal side however I suggested he should check his blood pressure at home if the numbers are stable at home he should continue same dose of medication if the numbers are higher he will notify us we can adjust the medication or add additional medication to his regimen for now he can continue withvasotec  - Comprehensive metabolic panel (BMP + Alb, Alk Phos, ALT, AST, Total. Bili, TP); Future  - enalapril (VASOTEC) 10 MG tablet; Take 1 tablet (10 mg) by mouth daily.    Screening for prostate cancer    - PSA, screen; Future    Lipid screening    - Lipid panel reflex to direct LDL Non-fasting; Future  - Comprehensive metabolic panel (BMP + Alb, Alk Phos, ALT, AST, Total. Bili, TP); Future    Dizziness  This could be benign positional vertigo will check some blood work and follow-up on that meanwhile he can use meclizine to see if that helps.  Advised to increase hydration  - Comprehensive metabolic panel (BMP + Alb, Alk Phos, ALT, AST, Total. Bili, TP); Future  - CBC with platelets; Future  - meclizine (ANTIVERT) 25 MG tablet; Take 1 tablet (25 mg) by mouth 3 times daily as needed for dizziness.    Blood in stool  On and off blood in the stool mostly alongside the stool that could be internal hemorrhoid or other etiology I suggested increased fiber and if this persists he needs to get a colonoscopy done which is ordered.  - Adult GI  Referral - Procedure Only; Future          BMI  Estimated body mass index is 26.52 kg/m  as calculated from the following:    Height as of 1/17/24: 1.765 m (5' 9.5\").    Weight as of this encounter: 82.6 kg (182 lb 3.2 oz).             Jalyn Abel is a 54 year old, presenting for the following health issues:  Dizziness  Patient complains of mild dizziness especially with changing position.  He also have underlying history of blood pressure he has been taking that medication. "  His blood pressure has been on the upper normal side however he is not checking that at home.  Denies any chest pains no shortness of breath.  On further questioning he noticed on and off some blood in the stool mostly if he strain.      1/8/2025     1:12 PM   Additional Questions   Roomed by Mayda LIU     History of Present Illness       Reason for visit:  Follow up  Symptom onset:  More than a month  Symptoms include:  Dizziness, bleeding  Symptom intensity:  Moderate  Symptom progression:  Worsening  Had these symptoms before:  Yes  Has tried/received treatment for these symptoms:  No   He is taking medications regularly.             Review of Systems  Constitutional, HEENT, cardiovascular, pulmonary, gi and gu systems are negative, except as otherwise noted.      Objective    BP (!) 152/96   Pulse 59   Temp 97.1  F (36.2  C)   Resp 18   Wt 82.6 kg (182 lb 3.2 oz)   SpO2 99%   BMI 26.52 kg/m    Body mass index is 26.52 kg/m .  Physical Exam   GENERAL: alert and no distress  NECK: no adenopathy, no asymmetry, masses, or scars  RESP: lungs clear to auscultation - no rales, rhonchi or wheezes  CV: regular rate and rhythm, normal S1 S2, no S3 or S4, no murmur, click or rub, no peripheral edema  ABDOMEN: soft, nontender, no hepatosplenomegaly, no masses and bowel sounds normal  MS: no gross musculoskeletal defects noted, no edema  Rectal exam offered however he refused to get that done.        Signed Electronically by: Guzman Avila MD

## 2025-04-21 ENCOUNTER — MYC MEDICAL ADVICE (OUTPATIENT)
Dept: FAMILY MEDICINE | Facility: CLINIC | Age: 55
End: 2025-04-21
Payer: COMMERCIAL

## 2025-06-14 ENCOUNTER — HEALTH MAINTENANCE LETTER (OUTPATIENT)
Age: 55
End: 2025-06-14

## 2025-07-15 ENCOUNTER — NURSE TRIAGE (OUTPATIENT)
Dept: FAMILY MEDICINE | Facility: CLINIC | Age: 55
End: 2025-07-15
Payer: COMMERCIAL

## 2025-07-15 NOTE — TELEPHONE ENCOUNTER
Reason for Call:  Appointment Request    Patient requesting this type of appt:  Preventive     Requested provider: Liat Montoya    Reason patient unable to be scheduled: Not within requested timeframe    When does patient want to be seen/preferred time: next available/as soon as able    Comments: Pt called to request to see Liat Montoya earlier than the scheduled appt on 8/4. He has been talking with a grief counselor the last few weeks while struggling with grief and increased anxiety; the counselor thinks he should be seen sooner than 8/4. If there is any way for Liat to see pt in the next week or two, that would be preferred for the pt so he can talk through some things.    Please give pt a call at your earliest convenience regarding this request.     Could we send this information to you in [id number] or would you prefer to receive a phone call?:   Patient would prefer a phone call   Okay to leave a detailed message?: Yes at Cell number on file:    Telephone Information:   Mobile [phone]       Call taken on 7/15/2025 at 7:25 AM by Omaira Capps

## 2025-07-15 NOTE — TELEPHONE ENCOUNTER
"Care advice and disposition reviewed with Patient. Patient scheduled for an appt with his PCP this week to discuss these concerns. RN did discuss with Patient that if prior to the appt if he feels too overwhelmed with the anxiety or depression, or if becomes actively suicidal to seek care in the ER. Patient verbalized agreement. RN assisted Patient in getting scheduled for an in person appt with PCP as follows:    Appointments in Next Year      Jul 18, 2025 1:00 PM  (Arrive by 12:40 PM)  Provider Visit with Liat Montoya PA-C  [facility] ([facility] [place] ) [phone]     1. CONCERN: \"Did anything happen that prompted you to call today?\"       Not today  2. ANXIETY SYMPTOMS: \"Can you describe how you (your loved one; patient) have been feeling?\" (e.g., tense, restless, panicky, anxious, keyed up, overwhelmed, sense of impending doom).       Overwhelmed, negativity, projecting anger around me, helplessness, loss of control of what's happening in my life  3. ONSET: \"How long have you been feeling this way?\" (e.g., hours, days, weeks)      At least a month, kind of pile on  4. SEVERITY: \"How would you rate the level of anxiety?\" (e.g., 0 - 10; or mild, moderate, severe).      10/10 yest, 6-7/10 today  5. FUNCTIONAL IMPAIRMENT: \"How have these feelings affected your ability to do daily activities?\" \"Have you had more difficulty than usual doing your normal daily activities?\" (e.g., getting better, same, worse; self-care, school, work, interactions)      Definitely can feel it in his work day. Mind wanders, restless  6. HISTORY: \"Have you felt this way before?\" \"Have you ever been diagnosed with an anxiety problem in the past?\" (e.g., generalized anxiety disorder, panic attacks, PTSD). If Yes, ask: \"How was this problem treated?\" (e.g., medicines, counseling, etc.)      no  7. RISK OF HARM - SUICIDAL IDEATION: \"Do you ever have thoughts of hurting or killing " "yourself?\" If Yes, ask:  \"Do you have these feelings now?\" \"Do you have a plan on how you would do this?\"      Denies, passive thoughts at times  8. TREATMENT:  \"What has been done so far to treat this anxiety?\" (e.g., medicines, relaxation strategies). \"What has helped?\"      Reached out to grief counselor two weeks ago. Has had two sessions with her and has had resources on betterment to sleep  9. THERAPIST: \"Do you have a counselor or therapist?\" If Yes, ask: \"What is their name?\"      yes  10. POTENTIAL TRIGGERS: \"Do you drink caffeinated beverages (e.g., coffee, justin, teas), and how much daily?\" \"Do you drink alcohol or use any drugs?\" \"Have you started any new medicines recently?\"        More soda, diet is more poor  11. PATIENT SUPPORT: \"Who is with you now?\" \"Who do you live with?\" \"Do you have family or friends who you can talk to?\"         Lots of supportive friends and family  12. OTHER SYMPTOMS: \"Do you have any other symptoms?\" (e.g., feeling depressed, trouble concentrating, trouble sleeping, trouble breathing, palpitations or fast heartbeat, chest pain, sweating, nausea, or diarrhea)        Feeling depressed, trouble concentrating, trouble sleeping, anger and negativity      Reason for Disposition   MODERATE anxiety (e.g., persistent or frequent anxiety symptoms; interferes with sleep, school, or work)    Additional Information   Negative: SEVERE difficulty breathing (e.g., struggling for each breath, speaks in single words)   Negative: Bluish (or gray) lips or face   Negative: Difficult to awaken or acting confused (e.g., disoriented, slurred speech)   Negative: Violent behavior, or threatening to physically hurt or kill someone   Negative: Sounds like a life-threatening emergency to the triager   Negative: Chest Pain   Negative: Palpitations, skipped heartbeat, or rapid heartbeat is main symptom   Negative: Suicide thoughts, threats, attempts, or questions   Negative: Depression is main problem or " symptom (e.g., feelings of sadness or hopelessness)   Negative: Difficulty breathing and persists > 10 minutes and not relieved by reassurance provided by triager   Negative: Feeling weak or lightheaded (e.g., woozy, feeling like they might faint), and lasts > 10 minutes and not relieved by reassurance provided by triager   Negative: SEVERE anxiety (e.g., extremely anxious with intense emotional symptoms such as feeling of unreality, urge to flee, unable to calm down; unable to cope or function), which is not better after 10 minutes of reassurance and Care Advice   Negative: Panic attack symptoms (e.g., sudden onset of intense fear and symptoms such as dizziness, feeling of impending doom or fear of dying, hyperventilation, tingling, sweating, trembling), and has not been evaluated for this by doctor (or NP/PA)   Negative: Panic attack symptoms (diagnosed in the past) that is not better with usual treatment, reassurance, or Care Advice   Negative: Alcohol or drug use, known or suspected, and feeling very shaky (i.e., visible tremors of hands)   Negative: Patient sounds very sick or weak to the triager   Negative: Patient sounds very upset or troubled to the triager    Protocols used: Anxiety and Panic Attack-A-OH    Charity RIDDLE,  Triage LOBITO  [facility]

## 2025-07-15 NOTE — TELEPHONE ENCOUNTER
Triage Patient Outreach    Attempt # 1    Was call answered?  No.  Left voicemail to return call to Triage at [facility]. Triage grief and anxiety. Routing to PCP for approval to use same day if no red flags appear.     Amanda Daniel RN

## 2025-07-21 ENCOUNTER — PATIENT OUTREACH (OUTPATIENT)
Dept: CARE COORDINATION | Facility: CLINIC | Age: 55
End: 2025-07-21
Payer: COMMERCIAL

## 2025-07-21 NOTE — PROGRESS NOTES
Clinic Care Coordination Contact  [facility]/Voicemail    Clinical Data: Care Coordinator Outreach    Outreach Documentation Number of Outreach Attempt   7/21/2025   9:49 AM 1       Left message on patient's voicemail with call back information and requested return call.      Plan: Care Coordinator will try to reach patient again in 1-2 business days.    EDWIN Cheng  Clinic Care Coordination  [facility]: [place], [place], [place], and [place] [facility]  Phone: [phone]

## 2025-07-22 NOTE — PROGRESS NOTES
Clinic Care Coordination Contact  Community Health Worker Initial Outreach    Patient Called Back.    Please see OV encounter 7/18/25    Reason for Referral:  Financial Support  Housing- financial adjustment / resources following spouse's death    CHW did not ask initial questions- pt at work and only had a few minutes.      CHW Initial Information Gathering:  Referral Source: PCP       Patient accepts CC: Yes. Patient scheduled for assessment with ESSENCE Sandoval on 7/31/25 at 3:00pm. Patient noted desire to discuss financial support, grief, and CC support.     EDWIN Cheng  Clinic Care Coordination  [facility]: [place], [place], [place], and [facility]  Phone: [phone]

## 2025-07-30 ENCOUNTER — PATIENT OUTREACH (OUTPATIENT)
Dept: CARE COORDINATION | Facility: CLINIC | Age: 55
End: 2025-07-30
Payer: COMMERCIAL

## 2025-07-30 SDOH — HEALTH STABILITY: PHYSICAL HEALTH: ON AVERAGE, HOW MANY MINUTES DO YOU ENGAGE IN EXERCISE AT THIS LEVEL?: 0 MIN

## 2025-07-30 SDOH — HEALTH STABILITY: PHYSICAL HEALTH: ON AVERAGE, HOW MANY DAYS PER WEEK DO YOU ENGAGE IN MODERATE TO STRENUOUS EXERCISE (LIKE A BRISK WALK)?: 0 DAYS

## 2025-07-30 ASSESSMENT — SOCIAL DETERMINANTS OF HEALTH (SDOH): HOW OFTEN DO YOU GET TOGETHER WITH FRIENDS OR RELATIVES?: ONCE A WEEK

## 2025-08-04 ENCOUNTER — OFFICE VISIT (OUTPATIENT)
Dept: FAMILY MEDICINE | Facility: CLINIC | Age: 55
End: 2025-08-04
Payer: COMMERCIAL

## 2025-08-04 VITALS
HEIGHT: 69 IN | BODY MASS INDEX: 24.32 KG/M2 | SYSTOLIC BLOOD PRESSURE: 130 MMHG | OXYGEN SATURATION: 100 % | TEMPERATURE: 98.5 F | WEIGHT: 164.2 LBS | RESPIRATION RATE: 18 BRPM | DIASTOLIC BLOOD PRESSURE: 82 MMHG | HEART RATE: 62 BPM

## 2025-08-04 DIAGNOSIS — F43.22 ADJUSTMENT DISORDER WITH ANXIOUS MOOD: ICD-10-CM

## 2025-08-04 DIAGNOSIS — K92.1 HEMATOCHEZIA: ICD-10-CM

## 2025-08-04 DIAGNOSIS — I10 BENIGN ESSENTIAL HYPERTENSION: ICD-10-CM

## 2025-08-04 DIAGNOSIS — Z13.9 ENCOUNTER FOR SCREENING INVOLVING SOCIAL DETERMINANTS OF HEALTH (SDOH): ICD-10-CM

## 2025-08-04 DIAGNOSIS — F43.21 GRIEF: ICD-10-CM

## 2025-08-04 DIAGNOSIS — Z00.00 ROUTINE GENERAL MEDICAL EXAMINATION AT A HEALTH CARE FACILITY: Primary | ICD-10-CM

## 2025-08-04 PROCEDURE — 99396 PREV VISIT EST AGE 40-64: CPT | Performed by: PHYSICIAN ASSISTANT

## 2025-08-04 PROCEDURE — 3079F DIAST BP 80-89 MM HG: CPT | Performed by: PHYSICIAN ASSISTANT

## 2025-08-04 PROCEDURE — 3075F SYST BP GE 130 - 139MM HG: CPT | Performed by: PHYSICIAN ASSISTANT

## 2025-08-04 PROCEDURE — 1126F AMNT PAIN NOTED NONE PRSNT: CPT | Performed by: PHYSICIAN ASSISTANT

## 2025-08-04 PROCEDURE — G2211 COMPLEX E/M VISIT ADD ON: HCPCS | Performed by: PHYSICIAN ASSISTANT

## 2025-08-04 PROCEDURE — 99214 OFFICE O/P EST MOD 30 MIN: CPT | Mod: 25 | Performed by: PHYSICIAN ASSISTANT

## 2025-08-04 RX ORDER — INFLUENZA A VIRUS A/VICTORIA/4897/2022 IVR-238 (H1N1) ANTIGEN (FORMALDEHYDE INACTIVATED), INFLUENZA A VIRUS A/CALIFORNIA/122/2022 SAN-022 (H3N2) ANTIGEN (FORMALDEHYDE INACTIVATED), AND INFLUENZA B VIRUS B/MICHIGAN/01/2021 ANTIGEN (FORMALDEHYDE INACTIVATED) 15; 15; 15 MG/.5ML; MG/.5ML; MG/.5ML
INJECTION, SUSPENSION INTRAMUSCULAR
COMMUNITY
Start: 2024-10-04 | End: 2025-08-04

## 2025-08-04 RX ORDER — ESCITALOPRAM OXALATE 5 MG/1
5 TABLET ORAL DAILY
Qty: 90 TABLET | Refills: 0 | Status: SHIPPED | OUTPATIENT
Start: 2025-08-04

## 2025-08-04 ASSESSMENT — PAIN SCALES - GENERAL: PAINLEVEL_OUTOF10: NO PAIN (0)

## 2025-08-07 ENCOUNTER — PATIENT OUTREACH (OUTPATIENT)
Dept: NURSING | Facility: CLINIC | Age: 55
End: 2025-08-07
Payer: COMMERCIAL

## 2025-08-11 ASSESSMENT — ACTIVITIES OF DAILY LIVING (ADL): DEPENDENT_IADLS:: INDEPENDENT

## (undated) DEVICE — KIT ENDO TURNOVER/PROCEDURE W/CLEAN A SCOPE LINERS 103888

## (undated) RX ORDER — FENTANYL CITRATE 50 UG/ML
INJECTION, SOLUTION INTRAMUSCULAR; INTRAVENOUS
Status: DISPENSED
Start: 2019-03-08

## (undated) RX ORDER — ONDANSETRON 2 MG/ML
INJECTION INTRAMUSCULAR; INTRAVENOUS
Status: DISPENSED
Start: 2019-03-08